# Patient Record
Sex: FEMALE | Race: OTHER | HISPANIC OR LATINO
[De-identification: names, ages, dates, MRNs, and addresses within clinical notes are randomized per-mention and may not be internally consistent; named-entity substitution may affect disease eponyms.]

---

## 2017-04-05 PROBLEM — Z00.00 ENCOUNTER FOR PREVENTIVE HEALTH EXAMINATION: Status: ACTIVE | Noted: 2017-04-05

## 2017-04-06 ENCOUNTER — APPOINTMENT (OUTPATIENT)
Dept: VASCULAR SURGERY | Facility: CLINIC | Age: 82
End: 2017-04-06

## 2017-04-06 VITALS
SYSTOLIC BLOOD PRESSURE: 155 MMHG | TEMPERATURE: 98.7 F | DIASTOLIC BLOOD PRESSURE: 76 MMHG | HEIGHT: 58.5 IN | HEART RATE: 70 BPM | RESPIRATION RATE: 16 BRPM | OXYGEN SATURATION: 99 % | WEIGHT: 150 LBS | BODY MASS INDEX: 30.65 KG/M2

## 2017-04-06 DIAGNOSIS — I78.1 NEVUS, NON-NEOPLASTIC: ICD-10-CM

## 2017-04-06 RX ORDER — METFORMIN HYDROCHLORIDE 1000 MG/1
1000 TABLET, COATED ORAL
Refills: 0 | Status: ACTIVE | COMMUNITY

## 2017-04-06 RX ORDER — GLIMEPIRIDE 1 MG/1
1 TABLET ORAL
Refills: 0 | Status: ACTIVE | COMMUNITY

## 2017-04-06 RX ORDER — LOSARTAN POTASSIUM 100 MG/1
TABLET, FILM COATED ORAL
Refills: 0 | Status: ACTIVE | COMMUNITY

## 2017-07-24 ENCOUNTER — APPOINTMENT (OUTPATIENT)
Dept: VASCULAR SURGERY | Facility: CLINIC | Age: 82
End: 2017-07-24

## 2018-08-01 ENCOUNTER — OUTPATIENT (OUTPATIENT)
Dept: OUTPATIENT SERVICES | Facility: HOSPITAL | Age: 83
LOS: 1 days | End: 2018-08-01
Payer: MEDICAID

## 2018-08-01 ENCOUNTER — OUTPATIENT (OUTPATIENT)
Dept: OUTPATIENT SERVICES | Facility: HOSPITAL | Age: 83
LOS: 1 days | End: 2018-08-01

## 2018-08-01 PROCEDURE — G9001: CPT

## 2018-08-07 ENCOUNTER — INPATIENT (INPATIENT)
Facility: HOSPITAL | Age: 83
LOS: 2 days | Discharge: HOME | DRG: 554 | End: 2018-08-10
Attending: HOSPITALIST | Admitting: HOSPITALIST
Payer: MEDICAID

## 2018-08-07 VITALS — HEIGHT: 62 IN | WEIGHT: 145.06 LBS

## 2018-08-07 DIAGNOSIS — I10 ESSENTIAL (PRIMARY) HYPERTENSION: ICD-10-CM

## 2018-08-07 DIAGNOSIS — R07.9 CHEST PAIN, UNSPECIFIED: ICD-10-CM

## 2018-08-07 DIAGNOSIS — E11.9 TYPE 2 DIABETES MELLITUS WITHOUT COMPLICATIONS: ICD-10-CM

## 2018-08-07 DIAGNOSIS — Z90.710 ACQUIRED ABSENCE OF BOTH CERVIX AND UTERUS: Chronic | ICD-10-CM

## 2018-08-07 DIAGNOSIS — M25.521 PAIN IN RIGHT ELBOW: ICD-10-CM

## 2018-08-07 DIAGNOSIS — E87.1 HYPO-OSMOLALITY AND HYPONATREMIA: ICD-10-CM

## 2018-08-07 DIAGNOSIS — M19.029 PRIMARY OSTEOARTHRITIS, UNSPECIFIED ELBOW: ICD-10-CM

## 2018-08-07 LAB
ALBUMIN SERPL ELPH-MCNC: 3.8 G/DL — SIGNIFICANT CHANGE UP (ref 3.3–5.2)
ALP SERPL-CCNC: 70 U/L — SIGNIFICANT CHANGE UP (ref 40–120)
ALT FLD-CCNC: 12 U/L — SIGNIFICANT CHANGE UP
ANION GAP SERPL CALC-SCNC: 16 MMOL/L — SIGNIFICANT CHANGE UP (ref 5–17)
APTT BLD: 27.8 SEC — SIGNIFICANT CHANGE UP (ref 27.5–37.4)
AST SERPL-CCNC: 28 U/L — SIGNIFICANT CHANGE UP
B PERT IGG+IGM PNL SER: ABNORMAL
BASOPHILS # BLD AUTO: 0 K/UL — SIGNIFICANT CHANGE UP (ref 0–0.2)
BASOPHILS NFR BLD AUTO: 0.1 % — SIGNIFICANT CHANGE UP (ref 0–2)
BILIRUB SERPL-MCNC: 0.3 MG/DL — LOW (ref 0.4–2)
BUN SERPL-MCNC: 22 MG/DL — HIGH (ref 8–20)
CALCIUM SERPL-MCNC: 9.3 MG/DL — SIGNIFICANT CHANGE UP (ref 8.6–10.2)
CHLORIDE SERPL-SCNC: 91 MMOL/L — LOW (ref 98–107)
CO2 SERPL-SCNC: 21 MMOL/L — LOW (ref 22–29)
COLOR FLD: YELLOW
CREAT SERPL-MCNC: 0.85 MG/DL — SIGNIFICANT CHANGE UP (ref 0.5–1.3)
CRYSTALS SNV QL MICRO: PRESENT — SIGNIFICANT CHANGE UP
EOSINOPHIL # BLD AUTO: 0 K/UL — SIGNIFICANT CHANGE UP (ref 0–0.5)
EOSINOPHIL NFR BLD AUTO: 0.2 % — SIGNIFICANT CHANGE UP (ref 0–6)
FLUID INTAKE SUBSTANCE CLASS: SIGNIFICANT CHANGE UP
FLUID SEGMENTED GRANULOCYTES: 89 % — SIGNIFICANT CHANGE UP
GLUCOSE BLDC GLUCOMTR-MCNC: 376 MG/DL — HIGH (ref 70–99)
GLUCOSE SERPL-MCNC: 309 MG/DL — HIGH (ref 70–115)
GRAM STN FLD: SIGNIFICANT CHANGE UP
HCT VFR BLD CALC: 33.4 % — LOW (ref 37–47)
HGB BLD-MCNC: 10.9 G/DL — LOW (ref 12–16)
INR BLD: 0.96 RATIO — SIGNIFICANT CHANGE UP (ref 0.88–1.16)
LYMPHOCYTES # BLD AUTO: 0.8 K/UL — LOW (ref 1–4.8)
LYMPHOCYTES # BLD AUTO: 7.7 % — LOW (ref 20–55)
LYMPHOCYTES # FLD: 11 % — SIGNIFICANT CHANGE UP
MCHC RBC-ENTMCNC: 28.3 PG — SIGNIFICANT CHANGE UP (ref 27–31)
MCHC RBC-ENTMCNC: 32.6 G/DL — SIGNIFICANT CHANGE UP (ref 32–36)
MCV RBC AUTO: 86.8 FL — SIGNIFICANT CHANGE UP (ref 81–99)
MONOCYTES # BLD AUTO: 1 K/UL — HIGH (ref 0–0.8)
MONOCYTES NFR BLD AUTO: 9.7 % — SIGNIFICANT CHANGE UP (ref 3–10)
NEUTROPHILS # BLD AUTO: 8.7 K/UL — HIGH (ref 1.8–8)
NEUTROPHILS NFR BLD AUTO: 82.1 % — HIGH (ref 37–73)
PLATELET # BLD AUTO: 214 K/UL — SIGNIFICANT CHANGE UP (ref 150–400)
POTASSIUM SERPL-MCNC: 4.9 MMOL/L — SIGNIFICANT CHANGE UP (ref 3.5–5.3)
POTASSIUM SERPL-SCNC: 4.9 MMOL/L — SIGNIFICANT CHANGE UP (ref 3.5–5.3)
PROT SERPL-MCNC: 7.4 G/DL — SIGNIFICANT CHANGE UP (ref 6.6–8.7)
PROTHROM AB SERPL-ACNC: 10.6 SEC — SIGNIFICANT CHANGE UP (ref 9.8–12.7)
RBC # BLD: 3.85 M/UL — LOW (ref 4.4–5.2)
RBC # FLD: 13.3 % — SIGNIFICANT CHANGE UP (ref 11–15.6)
RCV VOL RI: 2050 /UL — HIGH (ref 0–5)
SODIUM SERPL-SCNC: 128 MMOL/L — LOW (ref 135–145)
SPECIMEN SOURCE: SIGNIFICANT CHANGE UP
TOTAL NUCLEATED CELL COUNT, BODY FLUID: HIGH /UL (ref 0–5)
TROPONIN T SERPL-MCNC: <0.01 NG/ML — SIGNIFICANT CHANGE UP (ref 0–0.06)
TUBE TYPE: SIGNIFICANT CHANGE UP
WBC # BLD: 10.6 K/UL — SIGNIFICANT CHANGE UP (ref 4.8–10.8)
WBC # FLD AUTO: 10.6 K/UL — SIGNIFICANT CHANGE UP (ref 4.8–10.8)

## 2018-08-07 PROCEDURE — 73030 X-RAY EXAM OF SHOULDER: CPT | Mod: 26,RT

## 2018-08-07 PROCEDURE — 99285 EMERGENCY DEPT VISIT HI MDM: CPT | Mod: 25

## 2018-08-07 PROCEDURE — 71045 X-RAY EXAM CHEST 1 VIEW: CPT | Mod: 26

## 2018-08-07 PROCEDURE — 93010 ELECTROCARDIOGRAM REPORT: CPT

## 2018-08-07 PROCEDURE — 73070 X-RAY EXAM OF ELBOW: CPT | Mod: 26,RT

## 2018-08-07 PROCEDURE — 99222 1ST HOSP IP/OBS MODERATE 55: CPT

## 2018-08-07 PROCEDURE — 73200 CT UPPER EXTREMITY W/O DYE: CPT | Mod: 26,RT

## 2018-08-07 RX ORDER — ASPIRIN/CALCIUM CARB/MAGNESIUM 324 MG
325 TABLET ORAL ONCE
Qty: 0 | Refills: 0 | Status: DISCONTINUED | OUTPATIENT
Start: 2018-08-07 | End: 2018-08-07

## 2018-08-07 RX ORDER — DEXTROSE 50 % IN WATER 50 %
25 SYRINGE (ML) INTRAVENOUS ONCE
Qty: 0 | Refills: 0 | Status: DISCONTINUED | OUTPATIENT
Start: 2018-08-07 | End: 2018-08-10

## 2018-08-07 RX ORDER — DEXTROSE 50 % IN WATER 50 %
12.5 SYRINGE (ML) INTRAVENOUS ONCE
Qty: 0 | Refills: 0 | Status: DISCONTINUED | OUTPATIENT
Start: 2018-08-07 | End: 2018-08-10

## 2018-08-07 RX ORDER — SODIUM CHLORIDE 9 MG/ML
1000 INJECTION INTRAMUSCULAR; INTRAVENOUS; SUBCUTANEOUS
Qty: 0 | Refills: 0 | Status: DISCONTINUED | OUTPATIENT
Start: 2018-08-07 | End: 2018-08-10

## 2018-08-07 RX ORDER — ACETAMINOPHEN 500 MG
650 TABLET ORAL ONCE
Qty: 0 | Refills: 0 | Status: COMPLETED | OUTPATIENT
Start: 2018-08-07 | End: 2018-08-07

## 2018-08-07 RX ORDER — LOSARTAN POTASSIUM 100 MG/1
100 TABLET, FILM COATED ORAL DAILY
Qty: 0 | Refills: 0 | Status: DISCONTINUED | OUTPATIENT
Start: 2018-08-07 | End: 2018-08-10

## 2018-08-07 RX ORDER — INSULIN LISPRO 100/ML
VIAL (ML) SUBCUTANEOUS
Qty: 0 | Refills: 0 | Status: DISCONTINUED | OUTPATIENT
Start: 2018-08-07 | End: 2018-08-10

## 2018-08-07 RX ORDER — INSULIN LISPRO 100/ML
4 VIAL (ML) SUBCUTANEOUS ONCE
Qty: 0 | Refills: 0 | Status: COMPLETED | OUTPATIENT
Start: 2018-08-07 | End: 2018-08-07

## 2018-08-07 RX ORDER — GABAPENTIN 400 MG/1
0 CAPSULE ORAL
Qty: 0 | Refills: 0 | COMMUNITY

## 2018-08-07 RX ORDER — GLUCAGON INJECTION, SOLUTION 0.5 MG/.1ML
1 INJECTION, SOLUTION SUBCUTANEOUS ONCE
Qty: 0 | Refills: 0 | Status: DISCONTINUED | OUTPATIENT
Start: 2018-08-07 | End: 2018-08-10

## 2018-08-07 RX ORDER — VANCOMYCIN HCL 1 G
1000 VIAL (EA) INTRAVENOUS ONCE
Qty: 0 | Refills: 0 | Status: COMPLETED | OUTPATIENT
Start: 2018-08-07 | End: 2018-08-07

## 2018-08-07 RX ORDER — CIPROFLOXACIN LACTATE 400MG/40ML
400 VIAL (ML) INTRAVENOUS ONCE
Qty: 0 | Refills: 0 | Status: COMPLETED | OUTPATIENT
Start: 2018-08-07 | End: 2018-08-07

## 2018-08-07 RX ORDER — DEXTROSE 50 % IN WATER 50 %
15 SYRINGE (ML) INTRAVENOUS ONCE
Qty: 0 | Refills: 0 | Status: DISCONTINUED | OUTPATIENT
Start: 2018-08-07 | End: 2018-08-10

## 2018-08-07 RX ORDER — VANCOMYCIN HCL 1 G
1000 VIAL (EA) INTRAVENOUS EVERY 12 HOURS
Qty: 0 | Refills: 0 | Status: DISCONTINUED | OUTPATIENT
Start: 2018-08-08 | End: 2018-08-10

## 2018-08-07 RX ORDER — ACETAMINOPHEN 500 MG
650 TABLET ORAL EVERY 6 HOURS
Qty: 0 | Refills: 0 | Status: DISCONTINUED | OUTPATIENT
Start: 2018-08-07 | End: 2018-08-10

## 2018-08-07 RX ORDER — CHOLECALCIFEROL (VITAMIN D3) 125 MCG
1 CAPSULE ORAL
Qty: 0 | Refills: 0 | COMMUNITY

## 2018-08-07 RX ORDER — KETOROLAC TROMETHAMINE 30 MG/ML
30 SYRINGE (ML) INJECTION ONCE
Qty: 0 | Refills: 0 | Status: DISCONTINUED | OUTPATIENT
Start: 2018-08-07 | End: 2018-08-07

## 2018-08-07 RX ORDER — GABAPENTIN 400 MG/1
300 CAPSULE ORAL AT BEDTIME
Qty: 0 | Refills: 0 | Status: DISCONTINUED | OUTPATIENT
Start: 2018-08-07 | End: 2018-08-10

## 2018-08-07 RX ORDER — ASPIRIN/CALCIUM CARB/MAGNESIUM 324 MG
81 TABLET ORAL DAILY
Qty: 0 | Refills: 0 | Status: DISCONTINUED | OUTPATIENT
Start: 2018-08-07 | End: 2018-08-10

## 2018-08-07 RX ORDER — SODIUM CHLORIDE 9 MG/ML
1000 INJECTION, SOLUTION INTRAVENOUS
Qty: 0 | Refills: 0 | Status: DISCONTINUED | OUTPATIENT
Start: 2018-08-07 | End: 2018-08-10

## 2018-08-07 RX ORDER — CIPROFLOXACIN LACTATE 400MG/40ML
400 VIAL (ML) INTRAVENOUS EVERY 12 HOURS
Qty: 0 | Refills: 0 | Status: DISCONTINUED | OUTPATIENT
Start: 2018-08-07 | End: 2018-08-08

## 2018-08-07 RX ORDER — OXYCODONE AND ACETAMINOPHEN 5; 325 MG/1; MG/1
1 TABLET ORAL EVERY 6 HOURS
Qty: 0 | Refills: 0 | Status: DISCONTINUED | OUTPATIENT
Start: 2018-08-07 | End: 2018-08-10

## 2018-08-07 RX ORDER — MORPHINE SULFATE 50 MG/1
2 CAPSULE, EXTENDED RELEASE ORAL ONCE
Qty: 0 | Refills: 0 | Status: DISCONTINUED | OUTPATIENT
Start: 2018-08-07 | End: 2018-08-07

## 2018-08-07 RX ORDER — CHOLECALCIFEROL (VITAMIN D3) 125 MCG
1000 CAPSULE ORAL DAILY
Qty: 0 | Refills: 0 | Status: DISCONTINUED | OUTPATIENT
Start: 2018-08-07 | End: 2018-08-10

## 2018-08-07 RX ORDER — SODIUM CHLORIDE 9 MG/ML
1000 INJECTION INTRAMUSCULAR; INTRAVENOUS; SUBCUTANEOUS ONCE
Qty: 0 | Refills: 0 | Status: COMPLETED | OUTPATIENT
Start: 2018-08-07 | End: 2018-08-07

## 2018-08-07 RX ORDER — VANCOMYCIN HCL 1 G
VIAL (EA) INTRAVENOUS
Qty: 0 | Refills: 0 | Status: DISCONTINUED | OUTPATIENT
Start: 2018-08-07 | End: 2018-08-10

## 2018-08-07 RX ADMIN — SODIUM CHLORIDE 1000 MILLILITER(S): 9 INJECTION INTRAMUSCULAR; INTRAVENOUS; SUBCUTANEOUS at 10:22

## 2018-08-07 RX ADMIN — Medication 650 MILLIGRAM(S): at 18:40

## 2018-08-07 RX ADMIN — GABAPENTIN 300 MILLIGRAM(S): 400 CAPSULE ORAL at 21:53

## 2018-08-07 RX ADMIN — Medication 30 MILLIGRAM(S): at 10:45

## 2018-08-07 RX ADMIN — SODIUM CHLORIDE 100 MILLILITER(S): 9 INJECTION INTRAMUSCULAR; INTRAVENOUS; SUBCUTANEOUS at 21:52

## 2018-08-07 RX ADMIN — Medication 4 UNIT(S): at 23:52

## 2018-08-07 RX ADMIN — Medication 30 MILLIGRAM(S): at 10:22

## 2018-08-07 RX ADMIN — Medication 200 MILLIGRAM(S): at 23:11

## 2018-08-07 RX ADMIN — Medication 250 MILLIGRAM(S): at 21:53

## 2018-08-07 RX ADMIN — Medication 650 MILLIGRAM(S): at 17:37

## 2018-08-07 NOTE — ED ADULT NURSE REASSESSMENT NOTE - NS ED NURSE REASSESS COMMENT FT1
patient a&ox3. patient and daughter informed on plan of care. will continue to monitor.
patient awake, alert, pt. states she is feeling better. daughter at bedside. vss. as documented. will continue to monitor.
Pt A&Ox4 c/o right elbow pain at this time. Pt resting comfortably, VSS, no signs of distress at this time, awaiting bed, report given to HR RN GV, moved to CDU 10 R, safety maintained, call bell in reach.
Report received from day RN CB, charting as noted. Pt A&Ox4 c/o right elbow pain at this time. Pt resting comfortably, VSS, no signs of distress at this time, CM in place, awaiting bed, safety maintained, call bell in reach.

## 2018-08-07 NOTE — ED STATDOCS - ATTENDING CONTRIBUTION TO CARE
Case d/w ACP: agree with retriage to main ED for close monitoring during ED evaluation and treatment.  Protocol orders placed by ACP

## 2018-08-07 NOTE — ED PROVIDER NOTE - SKIN, MLM
Skin normal color for race, warm, dry and intact. No evidence of rash. Skin and oral mucosae pale, warm, dry and intact. No evidence of rash.

## 2018-08-07 NOTE — ED PROVIDER NOTE - PLAN OF CARE
We found crystals and white blood cells on your joint. We ruled out septic arthritis. Its recommended that you follow with you PMD MIC Collazo, within 48 hrs and with orthopedics within 1 week.

## 2018-08-07 NOTE — ED PROVIDER NOTE - OBJECTIVE STATEMENT
82 YO F with PMH of DM, HTN, HLD who c/o right elbow pain (can't say the quality) since yesterday at 3 pm, was 10/10, constant, radiated to ipsilateral forearm and hand, worsened by movement and mildly alleviated with Tylenol, associated with numbness o the extremity and with chest pain pressure like, shortness of breath, nausea overnight. Patient continued with pain this am, reason why was BIB daughter to the ED. Mentions that chest pain is not related to meals, not worsened by deep inspiration or chest movement.

## 2018-08-07 NOTE — ED PROVIDER NOTE - PROGRESS NOTE DETAILS
Patient s/p 1L IV NS bolus, Toradol. Now  s/p right elbow drainage by Ortho, tolerated procedure well. She is eating, comfortable in bed except for elbow pain 5/10. Fluid results pending, Patient s/p 1L IV NS bolus, Toradol. Now  s/p right elbow drainage by Ortho, tolerated procedure well. She is eating, comfortable in bed except for elbow pain 5/10. Will give Tylenol or pain. Fluid results pending,

## 2018-08-07 NOTE — ED PROVIDER NOTE - MEDICAL DECISION MAKING DETAILS
CBC, CMP, PT/PTT/INR, CT head, CXR, elbow CR, ASA, cardiac monitor, EKG CBC, CMP, PT/PTT/INR, CXR, elbow XR, cardiac monitor, EKG, ortho consult, pain management

## 2018-08-07 NOTE — ED STATDOCS - PROGRESS NOTE DETAILS
This is a 83 year old female with pmhx of HTN, DM BIB daughter with c/o chest pain radiating down R arm with weakness and dizziness and nausea that started at 3pm yesterday, daughter admits prior R shoulder SX x 4 years ago, no h/o stents, no h/o smoking, on PE patient unstead on feet upon standing from sitting position, unable to lift R arm up in air, will need further eval in MAIN, labs placed and EKG done.

## 2018-08-07 NOTE — ED PROVIDER NOTE - ATTENDING CONTRIBUTION TO CARE
83 year old with right elbow pain.  Patient denies trauma, fall or injury.  Swelling, warmth and tenderness on exam.  Decrease range of motion also noted on exam with pain to passive range of motion.  Xray shows no fracture of the elbow.  Orthopedics was consulted for concerns of septic arthritis.  Arthrocentesis performed by Ortho and patient admitted to medicine pending cultures.  Patient given medication for pain.

## 2018-08-07 NOTE — CONSULT NOTE ADULT - SUBJECTIVE AND OBJECTIVE BOX
Patient is a 83y Female presenting to the emergency department with a chief complaint of right elbow pain sudden onset beginning yesterday without trauma or injury. Patient states pain is located all around the elbow and radiates up to shoulder and wrist. Denies neck pain , numbness/tingling. Patient states she had a right shoulder dislocation 4 years ago but no injury recently. Denies fever, chills. Hospital  present for entire interaction.    REVIEW OF SYSTEMS    General:	denies fever, chills    Skin/Breast: denies rashes  	  Respiratory and Thorax: denies SOB  	  Cardiovascular:	denies CP    Gastrointestinal:	denies Abdominal pain    Genitourinary:	denies incontinence    Musculoskeletal:	 + right elbow and shoulder pain    Neurological:	denies paresthesias    PAST MEDICAL & SURGICAL HISTORY:  High blood pressure  Diabetes  H/O: hysterectomy    Allergies: penicillins (Unknown)    Medications: ketorolac   Injectable 30 milliGRAM(s) IV Push Once  sodium chloride 0.9% Bolus 1000 milliLiter(s) IV Bolus once    FAMILY HISTORY:  No pertinent family history in first degree relatives  : non-contributory    Social History: lives at home with daughter  ETOH: denies   Smoking: denies  illicit drug use: denies                         10.9   10.6  )-----------( 214      ( 07 Aug 2018 08:11 )             33.4     08-07    128<L>  |  91<L>  |  22.0<H>  ----------------------------<  309<H>  4.9   |  21.0<L>  |  0.85    PT/INR - ( 07 Aug 2018 08:11 )   PT: 10.6 sec;   INR: 0.96 ratio         PTT - ( 07 Aug 2018 08:11 )  PTT:27.8 sec    PHYSICAL EXAM:    Vital Signs Last 24 Hrs  T(C): 36.7 (07 Aug 2018 07:21), Max: 36.7 (07 Aug 2018 07:21)  T(F): 98.1 (07 Aug 2018 07:21), Max: 98.1 (07 Aug 2018 07:21)  HR: 78 (07 Aug 2018 07:21) (78 - 78)  BP: 135/80 (07 Aug 2018 07:21) (135/80 - 135/80)  RR: 16 (07 Aug 2018 07:21) (16 - 16)  SpO2: 95% (07 Aug 2018 07:21) (95% - 95%)    Appearance: Alert, responsive, in no acute distress.    Right elbow skin intact, no swelling redness or warmth noted. ROM limited, patient holding it in flexion of 90 degrees at her side. PROM extension to 45 flexion to 100 limited by pain. +TTP grossly med/lateral and posterior. No sign of olecranon bursitis, olecranon non-tender.     AIN/PIN/intrinsics intact  SILT Rad/med/uln distrib. Rad pulse +2  Wrist grossly NT, PROM of right shoulder limited to 35 degrees abduction by pain with associated hiking from initial start of range, +TTP around shoulder joint, skin intact, no erythema, open wounds or ecchymosis, no obvious deformity noted.    Imaging Studies: right elbow < from: Xray Elbow AP + Lateral, Right (08.07.18 @ 09:23) >   EXAM:  ELBOW 2VIEWS RT                          PROCEDURE DATE:  08/07/2018    INTERPRETATION:  Right elbow    3 views    History: Pain.    Osseous structures are intact. No evidence of acute fracture or   dislocation. Subchondral cysts and degenerative changes are present in   the radial head  There is no significant joint space narrowing. The   articular surfaces appear intact.. Effusion in the elbow. Posterior fat   pad sign    Impression:  Nonspecific effusion in the elbow.    Subchondral cysts and degenerative change in the radial head.     < end of copied text >    Right shoulder x-rays pending    A/P:  Pt is a  83y Female with right elbow pain r/o septic arthritis, right shoulder pain    PLAN:   ·	f/u right shoulder x-rays  ·	will d/w attending, likely needs IR aspiration of right elbow r/o septic arthritis  ·	NWB right UE  ·	Sling for comfort   ·	Will follow Patient is a 83y Female presenting to the emergency department with a chief complaint of right elbow pain sudden onset beginning yesterday without trauma or injury. Patient states pain is located all around the elbow and radiates up to shoulder and wrist. Denies neck pain , numbness/tingling. Patient states she had a right shoulder dislocation 4 years ago but no injury recently. Denies fever, chills. Hospital  present for entire interaction.    REVIEW OF SYSTEMS    General:	denies fever, chills    Skin/Breast: denies rashes  	  Respiratory and Thorax: denies SOB  	  Cardiovascular:	denies CP    Gastrointestinal:	denies Abdominal pain    Genitourinary:	denies incontinence    Musculoskeletal:	 + right elbow and shoulder pain    Neurological:	denies paresthesias    PAST MEDICAL & SURGICAL HISTORY:  High blood pressure  Diabetes  H/O: hysterectomy    Allergies: penicillins (Unknown)    Medications: ketorolac   Injectable 30 milliGRAM(s) IV Push Once  sodium chloride 0.9% Bolus 1000 milliLiter(s) IV Bolus once    FAMILY HISTORY:  No pertinent family history in first degree relatives  : non-contributory    Social History: lives at home with daughter  ETOH: denies   Smoking: denies  illicit drug use: denies                         10.9   10.6  )-----------( 214      ( 07 Aug 2018 08:11 )             33.4     08-07    128<L>  |  91<L>  |  22.0<H>  ----------------------------<  309<H>  4.9   |  21.0<L>  |  0.85    PT/INR - ( 07 Aug 2018 08:11 )   PT: 10.6 sec;   INR: 0.96 ratio         PTT - ( 07 Aug 2018 08:11 )  PTT:27.8 sec    PHYSICAL EXAM:    Vital Signs Last 24 Hrs  T(C): 36.7 (07 Aug 2018 07:21), Max: 36.7 (07 Aug 2018 07:21)  T(F): 98.1 (07 Aug 2018 07:21), Max: 98.1 (07 Aug 2018 07:21)  HR: 78 (07 Aug 2018 07:21) (78 - 78)  BP: 135/80 (07 Aug 2018 07:21) (135/80 - 135/80)  RR: 16 (07 Aug 2018 07:21) (16 - 16)  SpO2: 95% (07 Aug 2018 07:21) (95% - 95%)    Appearance: Alert, responsive, in no acute distress.    Right elbow skin intact, no swelling redness or warmth noted. ROM limited, patient holding it in flexion of 90 degrees at her side. PROM extension to 45 flexion to 100 limited by pain. +TTP grossly med/lateral and posterior. No sign of olecranon bursitis, olecranon non-tender.     AIN/PIN/intrinsics intact  SILT Rad/med/uln distrib. Rad pulse +2  Wrist grossly NT, PROM of right shoulder limited to 35 degrees abduction by pain with associated hiking from initial start of range, +TTP around shoulder joint, skin intact, no erythema, open wounds or ecchymosis, no obvious deformity noted.    Imaging Studies: right elbow < from: Xray Elbow AP + Lateral, Right (08.07.18 @ 09:23) >   EXAM:  ELBOW 2VIEWS RT                          PROCEDURE DATE:  08/07/2018    INTERPRETATION:  Right elbow    3 views    History: Pain.    Osseous structures are intact. No evidence of acute fracture or   dislocation. Subchondral cysts and degenerative changes are present in   the radial head  There is no significant joint space narrowing. The   articular surfaces appear intact.. Effusion in the elbow. Posterior fat   pad sign    Impression:  Nonspecific effusion in the elbow.    Subchondral cysts and degenerative change in the radial head.     < end of copied text >    Right shoulder x-rays pending    A/P:  Pt is a  83y Female with right elbow pain r/o septic arthritis, right shoulder pain    PLAN:   ·	f/u right shoulder x-rays  ·	will d/w attending, needs IR aspiration of right elbow r/o septic arthritis  ·	NWB right UE  ·	Sling for comfort   ·	Will follow  ·	D/w Dr. Davis Patient is a 83y Female presenting to the emergency department with a chief complaint of right elbow pain sudden onset beginning yesterday without trauma or injury. Patient states pain is located all around the elbow and radiates up to shoulder and wrist. Denies neck pain , numbness/tingling. Patient states she had a right shoulder dislocation 4 years ago but no injury recently. Denies fever, chills. Hospital  present for entire interaction.    REVIEW OF SYSTEMS    General:	denies fever, chills    Skin/Breast: denies rashes  	  Respiratory and Thorax: denies SOB  	  Cardiovascular:	denies CP    Gastrointestinal:	denies Abdominal pain    Genitourinary:	denies incontinence    Musculoskeletal:	 + right elbow and shoulder pain    Neurological:	denies paresthesias    PAST MEDICAL & SURGICAL HISTORY:  High blood pressure  Diabetes  H/O: hysterectomy    Allergies: penicillins (Unknown)    Medications: ketorolac   Injectable 30 milliGRAM(s) IV Push Once  sodium chloride 0.9% Bolus 1000 milliLiter(s) IV Bolus once    FAMILY HISTORY:  No pertinent family history in first degree relatives  : non-contributory    Social History: lives at home with daughter  ETOH: denies   Smoking: denies  illicit drug use: denies                         10.9   10.6  )-----------( 214      ( 07 Aug 2018 08:11 )             33.4     08-07    128<L>  |  91<L>  |  22.0<H>  ----------------------------<  309<H>  4.9   |  21.0<L>  |  0.85    PT/INR - ( 07 Aug 2018 08:11 )   PT: 10.6 sec;   INR: 0.96 ratio         PTT - ( 07 Aug 2018 08:11 )  PTT:27.8 sec    PHYSICAL EXAM:    Vital Signs Last 24 Hrs  T(C): 36.7 (07 Aug 2018 07:21), Max: 36.7 (07 Aug 2018 07:21)  T(F): 98.1 (07 Aug 2018 07:21), Max: 98.1 (07 Aug 2018 07:21)  HR: 78 (07 Aug 2018 07:21) (78 - 78)  BP: 135/80 (07 Aug 2018 07:21) (135/80 - 135/80)  RR: 16 (07 Aug 2018 07:21) (16 - 16)  SpO2: 95% (07 Aug 2018 07:21) (95% - 95%)    Appearance: Alert, responsive, in no acute distress.    Right elbow skin intact, no swelling redness or warmth noted. ROM limited, patient holding it in flexion of 90 degrees at her side. PROM extension to 45 flexion to 100 limited by pain. +TTP grossly med/lateral and posterior. No sign of olecranon bursitis, olecranon non-tender.     AIN/PIN/intrinsics intact  SILT Rad/med/uln distrib. Rad pulse +2  Wrist grossly NT, PROM of right shoulder limited to 35 degrees abduction by pain with associated hiking from initial start of range, +TTP around shoulder joint, skin intact, no erythema, open wounds or ecchymosis, no obvious deformity noted.  Cervical spine skin intact, no erythema, open wounds, or ecchymosis. Cervical spine grossly NT to palpation.    Imaging Studies: right elbow < from: Xray Elbow AP + Lateral, Right (08.07.18 @ 09:23) >   EXAM:  ELBOW 2VIEWS RT                          PROCEDURE DATE:  08/07/2018    INTERPRETATION:  Right elbow    3 views    History: Pain.    Osseous structures are intact. No evidence of acute fracture or   dislocation. Subchondral cysts and degenerative changes are present in   the radial head  There is no significant joint space narrowing. The   articular surfaces appear intact.. Effusion in the elbow. Posterior fat   pad sign    Impression:  Nonspecific effusion in the elbow.    Subchondral cysts and degenerative change in the radial head.     < end of copied text >    Right shoulder x-rays pending    A/P:  Pt is a  83y Female with right elbow pain r/o septic arthritis, right shoulder pain    PLAN:   ·	f/u right shoulder x-rays  ·	will d/w attending, needs IR aspiration of right elbow r/o septic arthritis  ·	NWB right UE  ·	Sling for comfort   ·	Will follow  ·	D/w Dr. Davis Patient is a 83y Female presenting to the emergency department with a chief complaint of right elbow pain sudden onset beginning yesterday without trauma or injury. Patient states pain is located all around the elbow and radiates up to shoulder and wrist. Denies neck pain , numbness/tingling. Patient states she had a right shoulder dislocation 4 years ago but no injury recently. Denies fever, chills. Hospital  present for entire interaction.    REVIEW OF SYSTEMS    General:	denies fever, chills    Skin/Breast: denies rashes  	  Respiratory and Thorax: denies SOB  	  Cardiovascular:	denies CP    Gastrointestinal:	denies Abdominal pain    Genitourinary:	denies incontinence    Musculoskeletal:	 + right elbow and shoulder pain    Neurological:	denies paresthesias    PAST MEDICAL & SURGICAL HISTORY:  High blood pressure  Diabetes  H/O: hysterectomy    Allergies: penicillins (Unknown)    Medications: ketorolac   Injectable 30 milliGRAM(s) IV Push Once  sodium chloride 0.9% Bolus 1000 milliLiter(s) IV Bolus once    FAMILY HISTORY:  No pertinent family history in first degree relatives  : non-contributory    Social History: lives at home with daughter  ETOH: denies   Smoking: denies  illicit drug use: denies                         10.9   10.6  )-----------( 214      ( 07 Aug 2018 08:11 )             33.4     08-07    128<L>  |  91<L>  |  22.0<H>  ----------------------------<  309<H>  4.9   |  21.0<L>  |  0.85    PT/INR - ( 07 Aug 2018 08:11 )   PT: 10.6 sec;   INR: 0.96 ratio         PTT - ( 07 Aug 2018 08:11 )  PTT:27.8 sec    PHYSICAL EXAM:    Vital Signs Last 24 Hrs  T(C): 36.7 (07 Aug 2018 07:21), Max: 36.7 (07 Aug 2018 07:21)  T(F): 98.1 (07 Aug 2018 07:21), Max: 98.1 (07 Aug 2018 07:21)  HR: 78 (07 Aug 2018 07:21) (78 - 78)  BP: 135/80 (07 Aug 2018 07:21) (135/80 - 135/80)  RR: 16 (07 Aug 2018 07:21) (16 - 16)  SpO2: 95% (07 Aug 2018 07:21) (95% - 95%)    Appearance: Alert, responsive, in no acute distress.    Right elbow skin intact, no swelling redness or warmth noted. ROM limited, patient holding it in flexion of 90 degrees at her side. PROM extension to 45 flexion to 100 limited by pain. +TTP grossly med/lateral and posterior. No sign of olecranon bursitis, olecranon non-tender.     AIN/PIN/intrinsics intact  SILT Rad/med/uln distrib. Rad pulse +2  Wrist grossly NT, PROM of right shoulder limited to 35 degrees abduction by pain with associated hiking from initial start of range, +TTP around shoulder joint, skin intact, no erythema, open wounds or ecchymosis, no obvious deformity noted.  Cervical spine skin intact, no erythema, open wounds, or ecchymosis. Cervical spine grossly NT to palpation.    Imaging Studies: right elbow < from: Xray Elbow AP + Lateral, Right (08.07.18 @ 09:23) >   EXAM:  ELBOW 2VIEWS RT                          PROCEDURE DATE:  08/07/2018    INTERPRETATION:  Right elbow    3 views    History: Pain.    Osseous structures are intact. No evidence of acute fracture or   dislocation. Subchondral cysts and degenerative changes are present in   the radial head  There is no significant joint space narrowing. The   articular surfaces appear intact.. Effusion in the elbow. Posterior fat   pad sign    Impression:  Nonspecific effusion in the elbow.    Subchondral cysts and degenerative change in the radial head.     < end of copied text >    Right shoulder x-rays pending    A/P:  Pt is a  83y Female with right elbow pain r/o septic arthritis, right shoulder pain    PLAN:   ·	f/u right shoulder x-rays  ·	will d/w attending, needs IR aspiration of right elbow r/o septic arthritis  ·	NWB right UE  ·	Sling for comfort   ·	Will follow  ·	D/w Dr. Davis    Addendum   Aspirate results  Cell Count, Body Fluid (08.07.18 @ 14:56)    Fluid Segmented Granulocytes: 89 %    Fluid Type: Synovial fluid    Body Fluid Appearance: Cloudy    BF Lymphocytes: 11 %    Tube Type: Lavender    Color - Body Fluid: Yellow    Total Nucleated Cell Count, Body Fluid: 03539 /uL    Total RBC Count: 2050 /uL  +crystals      Culture - Body Fluid with Gram Stain (collected 07 Aug 2018 14:57)  Source: .Body Fluid Right Elbow, Synovial Fluid  Gram Stain (07 Aug 2018 15:44):    Numerous White blood cells    No organisms seen    A/P:  gout vs septic joint  Admit to medicine  Pain control   will continue to follow for culture results  D/W Dr. Davis

## 2018-08-07 NOTE — ED ADULT NURSE NOTE - OBJECTIVE STATEMENT
called to bedside. 83 year old year female coming to ed 300 in the afternoon. patient denies fall or injury to arm, hot to touch. unknown fevers. as per patient when the arm pain gets severe, she gets the left sided chest pain and then that leads to severe nausea and dizziness. patient denies difficulty breathing. patient states she feels like she wants to vomit at times but does not.

## 2018-08-07 NOTE — H&P ADULT - HISTORY OF PRESENT ILLNESS
83 years old female with PMH of DM and HTN presented to the ER with 2 days history of R elbow pain and swelling. Pain is constant, 8/10, radiating up and down the elbow. No h/o trauma, fever,  chills or similar pain in the past . Patient also reports an episode of sub-sternal chest pain yesterday. Pain was 6/10 and associated with SOB. No chest pain at the moment.

## 2018-08-07 NOTE — ED ADULT NURSE REASSESSMENT NOTE - NSFALLRSKASSESSTYPE_ED_ALL_ED
Could consider switching to standard release 50 mg in the morning and 25 mg in the evening 25 mg t.i.d. This would be generic.  
Patient has been on Coreg CR 80 mg po daily.  His insurance company has denied coverage twice and recommend changing to a generic form.  We will review with Dr Partida and emperatriz to [facility].  
Please call him, he has med questions.   
Routine Reassessment
Routine Reassessment

## 2018-08-07 NOTE — ED ADULT NURSE REASSESSMENT NOTE - NSIMPLEMENTINTERV_GEN_ALL_ED
Implemented All Universal Safety Interventions:  [place] to call system. Call bell, personal items and telephone within reach. Instruct patient to call for assistance. Room bathroom lighting operational. Non-slip footwear when patient is off stretcher. Physically safe environment: no spills, clutter or unnecessary equipment. Stretcher in lowest position, wheels locked, appropriate side rails in place.
Implemented All Fall Risk Interventions:  [place] to call system. Call bell, personal items and telephone within reach. Instruct patient to call for assistance. Room bathroom lighting operational. Non-slip footwear when patient is off stretcher. Physically safe environment: no spills, clutter or unnecessary equipment. Stretcher in lowest position, wheels locked, appropriate side rails in place. Provide visual cue, wrist band, yellow gown, etc. Monitor gait and stability. Monitor for mental status changes and reorient to person, place, and time. Review medications for side effects contributing to fall risk. Reinforce activity limits and safety measures with patient and family.

## 2018-08-07 NOTE — ED ADULT NURSE REASSESSMENT NOTE - COMFORT CARE
repositioned/side rails up/darkened lights/warm blanket provided/wait time explained/plan of care explained

## 2018-08-07 NOTE — ED ADULT NURSE NOTE - NSIMPLEMENTINTERV_GEN_ALL_ED
Implemented All Fall Risk Interventions:  [place] to call system. Call bell, personal items and telephone within reach. Instruct patient to call for assistance. Room bathroom lighting operational. Non-slip footwear when patient is off stretcher. Physically safe environment: no spills, clutter or unnecessary equipment. Stretcher in lowest position, wheels locked, appropriate side rails in place. Provide visual cue, wrist band, yellow gown, etc. Monitor gait and stability. Monitor for mental status changes and reorient to person, place, and time. Review medications for side effects contributing to fall risk. Reinforce activity limits and safety measures with patient and family.

## 2018-08-07 NOTE — ED PROVIDER NOTE - ENMT NEGATIVE STATEMENT, MLM
Ears: no ear pain and no hearing problems. Nose: no nasal congestion and no nasal drainage .Mouth/Throat: no dysphagia, no hoarseness and no throat pain.Neck: no lumps, no pain, no stiffness and no swollen glands.

## 2018-08-07 NOTE — H&P ADULT - PROBLEM SELECTOR PLAN 1
?etiology.  Gout vs septic arthritis  x-ray of the elbow reviewed, no fracture or acute pathology.  x-ray of R shoulder concerning for nondisplaced glenoid cavity fracture  ortho has been consulted,   s/p R elbow arthrocentesis.  CT of R shoulder ordered.  started on vanco.  prn tylenol.  follow up synovial fluid results.  ID consult

## 2018-08-07 NOTE — ED ADULT NURSE REASSESSMENT NOTE - COMFORT CARE
warm blanket provided/repositioned/plan of care explained/side rails up/wait time explained/assisted to bathroom/darkened lights

## 2018-08-07 NOTE — ED PROVIDER NOTE - CARE PLAN
Principal Discharge DX:	Arthritis of elbow  Assessment and plan of treatment:	We found crystals and white blood cells on your joint. We ruled out septic arthritis. Its recommended that you follow with you PMD MIC Collazo, within 48 hrs and with orthopedics within 1 week. Principal Discharge DX:	Arthritis of elbow

## 2018-08-08 DIAGNOSIS — Z88.0 ALLERGY STATUS TO PENICILLIN: ICD-10-CM

## 2018-08-08 DIAGNOSIS — M19.029 PRIMARY OSTEOARTHRITIS, UNSPECIFIED ELBOW: ICD-10-CM

## 2018-08-08 LAB
ANION GAP SERPL CALC-SCNC: 12 MMOL/L — SIGNIFICANT CHANGE UP (ref 5–17)
BUN SERPL-MCNC: 18 MG/DL — SIGNIFICANT CHANGE UP (ref 8–20)
CALCIUM SERPL-MCNC: 8.5 MG/DL — LOW (ref 8.6–10.2)
CHLORIDE SERPL-SCNC: 100 MMOL/L — SIGNIFICANT CHANGE UP (ref 98–107)
CO2 SERPL-SCNC: 24 MMOL/L — SIGNIFICANT CHANGE UP (ref 22–29)
CREAT SERPL-MCNC: 0.8 MG/DL — SIGNIFICANT CHANGE UP (ref 0.5–1.3)
GLUCOSE BLDC GLUCOMTR-MCNC: 211 MG/DL — HIGH (ref 70–99)
GLUCOSE BLDC GLUCOMTR-MCNC: 253 MG/DL — HIGH (ref 70–99)
GLUCOSE BLDC GLUCOMTR-MCNC: 263 MG/DL — HIGH (ref 70–99)
GLUCOSE BLDC GLUCOMTR-MCNC: 292 MG/DL — HIGH (ref 70–99)
GLUCOSE SERPL-MCNC: 191 MG/DL — HIGH (ref 70–115)
HBA1C BLD-MCNC: 12 % — HIGH (ref 4–5.6)
POTASSIUM SERPL-MCNC: 3.7 MMOL/L — SIGNIFICANT CHANGE UP (ref 3.5–5.3)
POTASSIUM SERPL-SCNC: 3.7 MMOL/L — SIGNIFICANT CHANGE UP (ref 3.5–5.3)
SODIUM SERPL-SCNC: 136 MMOL/L — SIGNIFICANT CHANGE UP (ref 135–145)

## 2018-08-08 PROCEDURE — 99233 SBSQ HOSP IP/OBS HIGH 50: CPT

## 2018-08-08 PROCEDURE — 99223 1ST HOSP IP/OBS HIGH 75: CPT

## 2018-08-08 RX ORDER — ENOXAPARIN SODIUM 100 MG/ML
40 INJECTION SUBCUTANEOUS DAILY
Qty: 0 | Refills: 0 | Status: DISCONTINUED | OUTPATIENT
Start: 2018-08-08 | End: 2018-08-10

## 2018-08-08 RX ORDER — PANTOPRAZOLE SODIUM 20 MG/1
40 TABLET, DELAYED RELEASE ORAL
Qty: 0 | Refills: 0 | Status: DISCONTINUED | OUTPATIENT
Start: 2018-08-08 | End: 2018-08-10

## 2018-08-08 RX ORDER — INSULIN GLARGINE 100 [IU]/ML
10 INJECTION, SOLUTION SUBCUTANEOUS AT BEDTIME
Qty: 0 | Refills: 0 | Status: DISCONTINUED | OUTPATIENT
Start: 2018-08-08 | End: 2018-08-09

## 2018-08-08 RX ORDER — SACCHAROMYCES BOULARDII 250 MG
250 POWDER IN PACKET (EA) ORAL
Qty: 0 | Refills: 0 | Status: DISCONTINUED | OUTPATIENT
Start: 2018-08-08 | End: 2018-08-10

## 2018-08-08 RX ORDER — ONDANSETRON 8 MG/1
4 TABLET, FILM COATED ORAL ONCE
Qty: 0 | Refills: 0 | Status: DISCONTINUED | OUTPATIENT
Start: 2018-08-08 | End: 2018-08-10

## 2018-08-08 RX ORDER — MORPHINE SULFATE 50 MG/1
2 CAPSULE, EXTENDED RELEASE ORAL EVERY 6 HOURS
Qty: 0 | Refills: 0 | Status: DISCONTINUED | OUTPATIENT
Start: 2018-08-08 | End: 2018-08-10

## 2018-08-08 RX ADMIN — Medication 500 MILLIGRAM(S): at 18:53

## 2018-08-08 RX ADMIN — MORPHINE SULFATE 2 MILLIGRAM(S): 50 CAPSULE, EXTENDED RELEASE ORAL at 10:45

## 2018-08-08 RX ADMIN — GABAPENTIN 300 MILLIGRAM(S): 400 CAPSULE ORAL at 21:32

## 2018-08-08 RX ADMIN — MORPHINE SULFATE 2 MILLIGRAM(S): 50 CAPSULE, EXTENDED RELEASE ORAL at 12:00

## 2018-08-08 RX ADMIN — PANTOPRAZOLE SODIUM 40 MILLIGRAM(S): 20 TABLET, DELAYED RELEASE ORAL at 18:54

## 2018-08-08 RX ADMIN — LOSARTAN POTASSIUM 100 MILLIGRAM(S): 100 TABLET, FILM COATED ORAL at 05:20

## 2018-08-08 RX ADMIN — Medication 1000 UNIT(S): at 12:35

## 2018-08-08 RX ADMIN — INSULIN GLARGINE 10 UNIT(S): 100 INJECTION, SOLUTION SUBCUTANEOUS at 21:31

## 2018-08-08 RX ADMIN — SODIUM CHLORIDE 100 MILLILITER(S): 9 INJECTION INTRAMUSCULAR; INTRAVENOUS; SUBCUTANEOUS at 05:19

## 2018-08-08 RX ADMIN — Medication 3: at 12:35

## 2018-08-08 RX ADMIN — Medication 3: at 19:03

## 2018-08-08 RX ADMIN — Medication 250 MILLIGRAM(S): at 21:31

## 2018-08-08 RX ADMIN — Medication 81 MILLIGRAM(S): at 12:35

## 2018-08-08 RX ADMIN — Medication 2: at 08:39

## 2018-08-08 RX ADMIN — Medication 250 MILLIGRAM(S): at 18:53

## 2018-08-08 RX ADMIN — ENOXAPARIN SODIUM 40 MILLIGRAM(S): 100 INJECTION SUBCUTANEOUS at 12:34

## 2018-08-08 NOTE — PATIENT PROFILE ADULT. - PAIN, FACTORS THAT RELIEVE, PROFILE
Called patient per Dr Garcia Her to get his heart rate. Patient states he played golf today with no shortness of breath, heavy breathing, or no pain. He said he don't have anything to monitor his heart rate and its not on his blood pressure machine. Informed patient if he start to have any of those issues to go to the ER or call the office to get an appointment. He stated thanks for checking up on him and he will go or call if it becomes an issue for him.
medications

## 2018-08-08 NOTE — PROGRESS NOTE ADULT - ASSESSMENT
Problem/Plan - 1:  ·  Problem: Elbow pain, right. --> ortho and id following  s/p R elbow arthrocentesis.  --> c.w iv vanco  --> follow up cultures  --> will start naprosyn bid for 1 day      Problem/Plan - 2:  ·  Problem: Chest pain.  Plan: resolved.  negative troponin, No EKG changes.     Problem/Plan - 3:  ·  Problem: Hyponatremia. resolved     Problem/Plan - 4:  ·  Problem: HTN (hypertension).  Plan: controlled.  --> home meds     Problem/Plan - 5:  ·  Problem: Diabetes.  poorly controlled  --> diabetic education  --> start lantus qhs  --> ssi

## 2018-08-08 NOTE — CONSULT NOTE ADULT - SUBJECTIVE AND OBJECTIVE BOX
[facility]  INFECTIOUS DISEASES AND INTERNAL MEDICINE at [facility]  =======================================================  Govind Coronado MD  Diplomates American Board of Internal Medicine and Infectious Diseases  =======================================================      [id number]  EMERITA CADE .  Seen with an     CC: Patient is a 83y old  Female who presents with a chief complaint of R elbow pain (07 Aug 2018 20:39)    84y/o  Female with h/o DM and HTN. Patient comes to the ER with 2-3 days of R elbow pain and swelling, associated with decreased ROM. No associated fevers or chills. Only worsened with movement. In the ER patient was afebrile, no leukocytosis. Synovial aspiration done in the ER by orthopedics. Patient started on IV Vancomycin. ID input requested.       Past Medical & Surgical Hx:  High blood pressure  Diabetes  H/O: hysterectomy      Social Hx:  Denies smoking, ETOH or drug use      FAMILY HISTORY:  No pertinent family history in first degree relatives      Allergies  penicillins (Unknown)      Antibiotics:    vancomycin  IVPB 1000 milliGRAM(s) IV Intermittent every 12 hours       REVIEW OF SYSTEMS:  CONSTITUTIONAL:  No Fever or chills  HEENT:  No diplopia or blurred vision.  No earache, sore throat or runny nose.  CARDIOVASCULAR:  No pressure, squeezing, strangling, tightness, heaviness or aching about the chest, neck, axilla or epigastrium.  RESPIRATORY:  No cough, shortness of breath  GASTROINTESTINAL:  No nausea, vomiting or diarrhea.  GENITOURINARY:  No dysuria, frequency or urgency. No Blood in urine  MUSCULOSKELETAL:  Rt Elbow pain  SKIN:  No change in skin, hair or nails.  NEUROLOGIC:  No paresthesias, fasciculations  PSYCHIATRIC:  No disorder of thought or mood.  ENDOCRINE:  No heat or cold intolerance  HEMATOLOGICAL:  No easy bruising or bleeding.       Physical Exam:  Vital Signs Last 24 Hrs  T(C): 37 (08 Aug 2018 08:10), Max: 37.2 (07 Aug 2018 19:50)  T(F): 98.6 (08 Aug 2018 08:10), Max: 99 (07 Aug 2018 19:50)  HR: 65 (08 Aug 2018 08:10) (64 - 69)  BP: 177/73 (08 Aug 2018 08:10) (106/70 - 177/73)  BP(mean): 82 (07 Aug 2018 20:39) (82 - 82)  RR: 18 (08 Aug 2018 08:10) (17 - 18)  SpO2: 98% (08 Aug 2018 08:10) (96% - 100%)      GEN: NAD, pleasant  HEENT: normocephalic and atraumatic. EOMI. PERRL.    NECK: Supple.   LUNGS: Clear to auscultation.  HEART: Regular rate and rhythm   ABDOMEN: Soft, nontender, and nondistended.  Positive bowel sounds.    : No CVA tenderness  EXTREMITIES: Without any edema.  MSK: Rt Elbow in dressing  NEUROLOGIC: No focal deficits  PSYCHIATRIC: Appropriate affect .  SKIN: No Rash      Labs:  08-08    136  |  100  |  18.0  ----------------------------<  191<H>  3.7   |  24.0  |  0.80    Ca    8.5<L>      08 Aug 2018 06:03    TPro  7.4  /  Alb  3.8  /  TBili  0.3<L>  /  DBili  x   /  AST  28  /  ALT  12  /  AlkPhos  70  08-07                          10.9   10.6  )-----------( 214      ( 07 Aug 2018 08:11 )             33.4       PT/INR - ( 07 Aug 2018 08:11 )   PT: 10.6 sec;   INR: 0.96 ratio         PTT - ( 07 Aug 2018 08:11 )  PTT:27.8 sec    LIVER FUNCTIONS - ( 07 Aug 2018 08:11 )  Alb: 3.8 g/dL / Pro: 7.4 g/dL / ALK PHOS: 70 U/L / ALT: 12 U/L / AST: 28 U/L / GGT: x           CARDIAC MARKERS ( 07 Aug 2018 08:11 )  x     / <0.01 ng/mL / x     / x     / x          Crystals, Synovial Fluid (08.07.18 @ 14:56)    Crystals, Synovial Fluid:   present      Cell Count, Body Fluid (08.07.18 @ 14:56)    Fluid Segmented Granulocytes: 89 %    Fluid Type: Synovial fluid    Body Fluid Appearance: Cloudy    BF Lymphocytes: 11 %    Tube Type: Lavender    Color - Body Fluid: Yellow    Total Nucleated Cell Count, Body Fluid: 54343 /uL    Total RBC Count: 2050 /uL      RECENT CULTURES:  08-07 @ 14:57 .Body Fluid Right Elbow, Synovial Fluid     Numerous White blood cells  No organisms seen

## 2018-08-08 NOTE — PROGRESS NOTE ADULT - SUBJECTIVE AND OBJECTIVE BOX
EMERITA CADE    [id number]    83y      Female    INTERVAL HPI/OVERNIGHT EVENTS:    patient being seen for elbow pain s/p aspiration, poorly controlled dm2 and med management. patient seen at bedside with daughter and continues to have     REVIEW OF SYSTEMS:    CONSTITUTIONAL: No fever, weight loss, or fatigue  RESPIRATORY: No cough, wheezing, hemoptysis; No shortness of breath  CARDIOVASCULAR: No chest pain, palpitations  GASTROINTESTINAL: No abdominal or epigastric pain. No nausea, vomiting  NEUROLOGICAL: No headaches, memory loss, loss of strength.  MISCELLANEOUS:      Vital Signs Last 24 Hrs  T(C): 36.8 (08 Aug 2018 11:20), Max: 37.2 (07 Aug 2018 19:50)  T(F): 98.3 (08 Aug 2018 11:20), Max: 99 (07 Aug 2018 19:50)  HR: 68 (08 Aug 2018 11:20) (64 - 69)  BP: 117/60 (08 Aug 2018 11:20) (106/70 - 177/73)  BP(mean): 82 (07 Aug 2018 20:39) (82 - 82)  RR: 18 (08 Aug 2018 11:20) (17 - 18)  SpO2: 97% (08 Aug 2018 11:20) (96% - 99%)    PHYSICAL EXAM:    GENERAL: NAD, well-groomed  HEENT: PERRL, +EOMI  NECK: soft, Supple, No JVD,   CHEST/LUNG: Clear to auscultation bilaterally; No wheezing  HEART: S1S2+, Regular rate and rhythm; No murmurs, rubs, or gallops  ABDOMEN: Soft, Nontender, Nondistended; Bowel sounds present  EXTREMITIES:  2+ Peripheral Pulses, No clubbing, cyanosis, or edema  SKIN: No rashes or lesions  NEURO: AAOX3, no focal deficits, no motor r sensory loss  PSYCH: normal mood      LABS:                        10.9   10.6  )-----------( 214      ( 07 Aug 2018 08:11 )             33.4     08-08    136  |  100  |  18.0  ----------------------------<  191<H>  3.7   |  24.0  |  0.80    Ca    8.5<L>      08 Aug 2018 06:03    TPro  7.4  /  Alb  3.8  /  TBili  0.3<L>  /  DBili  x   /  AST  28  /  ALT  12  /  AlkPhos  70  08-07    PT/INR - ( 07 Aug 2018 08:11 )   PT: 10.6 sec;   INR: 0.96 ratio         PTT - ( 07 Aug 2018 08:11 )  PTT:27.8 sec        MEDICATIONS  (STANDING):  aspirin  chewable 81 milliGRAM(s) Oral daily  cholecalciferol 1000 Unit(s) Oral daily  dextrose 5%. 1000 milliLiter(s) (50 mL/Hr) IV Continuous <Continuous>  dextrose 50% Injectable 12.5 Gram(s) IV Push once  dextrose 50% Injectable 25 Gram(s) IV Push once  dextrose 50% Injectable 25 Gram(s) IV Push once  enoxaparin Injectable 40 milliGRAM(s) SubCutaneous daily  gabapentin 300 milliGRAM(s) Oral at bedtime  insulin glargine Injectable (LANTUS) 10 Unit(s) SubCutaneous at bedtime  insulin lispro (HumaLOG) corrective regimen sliding scale   SubCutaneous three times a day before meals  losartan 100 milliGRAM(s) Oral daily  naproxen 500 milliGRAM(s) Oral daily  pantoprazole    Tablet 40 milliGRAM(s) Oral before breakfast  saccharomyces boulardii 250 milliGRAM(s) Oral two times a day  sodium chloride 0.9%. 1000 milliLiter(s) (100 mL/Hr) IV Continuous <Continuous>  vancomycin  IVPB      vancomycin  IVPB 1000 milliGRAM(s) IV Intermittent every 12 hours    MEDICATIONS  (PRN):  acetaminophen   Tablet. 650 milliGRAM(s) Oral every 6 hours PRN Mild Pain (1 - 3)  dextrose 40% Gel 15 Gram(s) Oral once PRN Blood Glucose LESS THAN 70 milliGRAM(s)/deciliter  glucagon  Injectable 1 milliGRAM(s) IntraMuscular once PRN Glucose LESS THAN 70 milligrams/deciliter  morphine  - Injectable 2 milliGRAM(s) IV Push every 6 hours PRN Severe Pain (7 - 10)  ondansetron Injectable 4 milliGRAM(s) IV Push once PRN Nausea and/or Vomiting  oxyCODONE    5 mG/acetaminophen 325 mG 1 Tablet(s) Oral every 6 hours PRN Moderate Pain (4 - 6)      RADIOLOGY & ADDITIONAL TESTS: EMERITA CADE    [id number]    83y      Female    INTERVAL HPI/OVERNIGHT EVENTS:    patient being seen for elbow pain s/p aspiration, poorly controlled dm2 and med management. patient seen at bedside with daughter and continues to have elbow pain.     REVIEW OF SYSTEMS:    CONSTITUTIONAL" pain   RESPIRATORY: No cough, wheezing, hemoptysis; No shortness of breath  CARDIOVASCULAR: No chest pain, palpitations  GASTROINTESTINAL: No abdominal or epigastric pain. No nausea, vomiting  NEUROLOGICAL: No headaches, memory loss, loss of strength.  MISCELLANEOUS:      Vital Signs Last 24 Hrs  T(C): 36.8 (08 Aug 2018 11:20), Max: 37.2 (07 Aug 2018 19:50)  T(F): 98.3 (08 Aug 2018 11:20), Max: 99 (07 Aug 2018 19:50)  HR: 68 (08 Aug 2018 11:20) (64 - 69)  BP: 117/60 (08 Aug 2018 11:20) (106/70 - 177/73)  BP(mean): 82 (07 Aug 2018 20:39) (82 - 82)  RR: 18 (08 Aug 2018 11:20) (17 - 18)  SpO2: 97% (08 Aug 2018 11:20) (96% - 99%)    PHYSICAL EXAM:    GENERAL: NAD,   HEENT: PERRL, +EOMI  CHEST/LUNG: Clear to auscultation bilaterally; No wheezing  HEART: S1S2+, Regular rate and rhythm; No murmurs,  ABDOMEN: Soft, Nontender,  EXTREMITIES:  right elbow tenderness   SKIN: No rashes or lesions  NEURO: AAOX3,   PSYCH: normal mood      LABS:                        10.9   10.6  )-----------( 214      ( 07 Aug 2018 08:11 )             33.4     08-08    136  |  100  |  18.0  ----------------------------<  191<H>  3.7   |  24.0  |  0.80    Ca    8.5<L>      08 Aug 2018 06:03    TPro  7.4  /  Alb  3.8  /  TBili  0.3<L>  /  DBili  x   /  AST  28  /  ALT  12  /  AlkPhos  70  08-07    PT/INR - ( 07 Aug 2018 08:11 )   PT: 10.6 sec;   INR: 0.96 ratio         PTT - ( 07 Aug 2018 08:11 )  PTT:27.8 sec        MEDICATIONS  (STANDING):  aspirin  chewable 81 milliGRAM(s) Oral daily  cholecalciferol 1000 Unit(s) Oral daily  dextrose 5%. 1000 milliLiter(s) (50 mL/Hr) IV Continuous <Continuous>  dextrose 50% Injectable 12.5 Gram(s) IV Push once  dextrose 50% Injectable 25 Gram(s) IV Push once  dextrose 50% Injectable 25 Gram(s) IV Push once  enoxaparin Injectable 40 milliGRAM(s) SubCutaneous daily  gabapentin 300 milliGRAM(s) Oral at bedtime  insulin glargine Injectable (LANTUS) 10 Unit(s) SubCutaneous at bedtime  insulin lispro (HumaLOG) corrective regimen sliding scale   SubCutaneous three times a day before meals  losartan 100 milliGRAM(s) Oral daily  naproxen 500 milliGRAM(s) Oral daily  pantoprazole    Tablet 40 milliGRAM(s) Oral before breakfast  saccharomyces boulardii 250 milliGRAM(s) Oral two times a day  sodium chloride 0.9%. 1000 milliLiter(s) (100 mL/Hr) IV Continuous <Continuous>  vancomycin  IVPB      vancomycin  IVPB 1000 milliGRAM(s) IV Intermittent every 12 hours    MEDICATIONS  (PRN):  acetaminophen   Tablet. 650 milliGRAM(s) Oral every 6 hours PRN Mild Pain (1 - 3)  dextrose 40% Gel 15 Gram(s) Oral once PRN Blood Glucose LESS THAN 70 milliGRAM(s)/deciliter  glucagon  Injectable 1 milliGRAM(s) IntraMuscular once PRN Glucose LESS THAN 70 milligrams/deciliter  morphine  - Injectable 2 milliGRAM(s) IV Push every 6 hours PRN Severe Pain (7 - 10)  ondansetron Injectable 4 milliGRAM(s) IV Push once PRN Nausea and/or Vomiting  oxyCODONE    5 mG/acetaminophen 325 mG 1 Tablet(s) Oral every 6 hours PRN Moderate Pain (4 - 6)      RADIOLOGY & ADDITIONAL TESTS:

## 2018-08-08 NOTE — PROGRESS NOTE ADULT - SUBJECTIVE AND OBJECTIVE BOX
Patient seen and examined at bedside, with . Reports improvement of pain and elbow ROM since aspiration performed yesterday. Aspiration + for crystals. No complaints.    Vital Signs Last 24 Hrs  T(C): 37 (08 Aug 2018 08:10), Max: 37.2 (07 Aug 2018 19:50)  T(F): 98.6 (08 Aug 2018 08:10), Max: 99 (07 Aug 2018 19:50)  HR: 65 (08 Aug 2018 08:10) (64 - 69)  BP: 177/73 (08 Aug 2018 08:10) (106/70 - 177/73)  BP(mean): 82 (07 Aug 2018 20:39) (82 - 82)  RR: 18 (08 Aug 2018 08:10) (17 - 18)  SpO2: 98% (08 Aug 2018 08:10) (96% - 100%)    RUE: Ace bandage dressing C/D/I. Removed. Patient able to AROM elbow,  with minimal pain. + WF/WE. + FROM phalanges. SILT. ext warm cap refill brisk    Crystals, Synovial Fluid (08.07.18 @ 14:56)    Crystals, Synovial Fluid:   present    Culture - Body Fluid with Gram Stain (08.07.18 @ 14:57)    Gram Stain:   Numerous White blood cells  No organisms seen    Specimen Source: .Body Fluid Right Elbow, Synovial Fluid    A/P: 83 y.o F with right elbow gouty arthritis  - recommend NSAIDs if patient can tolerate  - continue to follow cultures  - abx as per ID

## 2018-08-08 NOTE — CONSULT NOTE ADULT - PROBLEM SELECTOR RECOMMENDATION 9
Synovial fluid with elevated WBC and + Crystals   Likely gout vs septic arthritis   Will Continue IV Vancomycin pending culture results  If cultures negative can D/C antibiotics

## 2018-08-09 DIAGNOSIS — Z71.89 OTHER SPECIFIED COUNSELING: ICD-10-CM

## 2018-08-09 LAB
ANION GAP SERPL CALC-SCNC: 12 MMOL/L — SIGNIFICANT CHANGE UP (ref 5–17)
BUN SERPL-MCNC: 18 MG/DL — SIGNIFICANT CHANGE UP (ref 8–20)
CALCIUM SERPL-MCNC: 8.9 MG/DL — SIGNIFICANT CHANGE UP (ref 8.6–10.2)
CHLORIDE SERPL-SCNC: 101 MMOL/L — SIGNIFICANT CHANGE UP (ref 98–107)
CHOLEST SERPL-MCNC: 219 MG/DL — HIGH (ref 110–199)
CO2 SERPL-SCNC: 25 MMOL/L — SIGNIFICANT CHANGE UP (ref 22–29)
CREAT SERPL-MCNC: 0.84 MG/DL — SIGNIFICANT CHANGE UP (ref 0.5–1.3)
GLUCOSE BLDC GLUCOMTR-MCNC: 102 MG/DL — HIGH (ref 70–99)
GLUCOSE BLDC GLUCOMTR-MCNC: 200 MG/DL — HIGH (ref 70–99)
GLUCOSE BLDC GLUCOMTR-MCNC: 221 MG/DL — HIGH (ref 70–99)
GLUCOSE BLDC GLUCOMTR-MCNC: 248 MG/DL — HIGH (ref 70–99)
GLUCOSE SERPL-MCNC: 208 MG/DL — HIGH (ref 70–115)
HCT VFR BLD CALC: 31.9 % — LOW (ref 37–47)
HDLC SERPL-MCNC: 62 MG/DL — LOW
HGB BLD-MCNC: 10.6 G/DL — LOW (ref 12–16)
LIPID PNL WITH DIRECT LDL SERPL: 128 MG/DL — SIGNIFICANT CHANGE UP
MAGNESIUM SERPL-MCNC: 1.7 MG/DL — SIGNIFICANT CHANGE UP (ref 1.6–2.6)
MCHC RBC-ENTMCNC: 29 PG — SIGNIFICANT CHANGE UP (ref 27–31)
MCHC RBC-ENTMCNC: 33.2 G/DL — SIGNIFICANT CHANGE UP (ref 32–36)
MCV RBC AUTO: 87.2 FL — SIGNIFICANT CHANGE UP (ref 81–99)
PLATELET # BLD AUTO: 216 K/UL — SIGNIFICANT CHANGE UP (ref 150–400)
POTASSIUM SERPL-MCNC: 4.5 MMOL/L — SIGNIFICANT CHANGE UP (ref 3.5–5.3)
POTASSIUM SERPL-SCNC: 4.5 MMOL/L — SIGNIFICANT CHANGE UP (ref 3.5–5.3)
RBC # BLD: 3.66 M/UL — LOW (ref 4.4–5.2)
RBC # FLD: 13.1 % — SIGNIFICANT CHANGE UP (ref 11–15.6)
SODIUM SERPL-SCNC: 138 MMOL/L — SIGNIFICANT CHANGE UP (ref 135–145)
TOTAL CHOLESTEROL/HDL RATIO MEASUREMENT: 4 RATIO — SIGNIFICANT CHANGE UP (ref 3.3–7.1)
TRIGL SERPL-MCNC: 146 MG/DL — SIGNIFICANT CHANGE UP (ref 10–200)
TSH SERPL-MCNC: 2.17 UIU/ML — SIGNIFICANT CHANGE UP (ref 0.27–4.2)
WBC # BLD: 7 K/UL — SIGNIFICANT CHANGE UP (ref 4.8–10.8)
WBC # FLD AUTO: 7 K/UL — SIGNIFICANT CHANGE UP (ref 4.8–10.8)

## 2018-08-09 PROCEDURE — 99233 SBSQ HOSP IP/OBS HIGH 50: CPT

## 2018-08-09 RX ORDER — MAGNESIUM SULFATE 500 MG/ML
1 VIAL (ML) INJECTION ONCE
Qty: 0 | Refills: 0 | Status: COMPLETED | OUTPATIENT
Start: 2018-08-09 | End: 2018-08-09

## 2018-08-09 RX ORDER — INSULIN GLARGINE 100 [IU]/ML
20 INJECTION, SOLUTION SUBCUTANEOUS AT BEDTIME
Qty: 0 | Refills: 0 | Status: DISCONTINUED | OUTPATIENT
Start: 2018-08-09 | End: 2018-08-10

## 2018-08-09 RX ORDER — COLCHICINE 0.6 MG
0.6 TABLET ORAL DAILY
Qty: 0 | Refills: 0 | Status: DISCONTINUED | OUTPATIENT
Start: 2018-08-09 | End: 2018-08-10

## 2018-08-09 RX ORDER — AMLODIPINE BESYLATE 2.5 MG/1
2.5 TABLET ORAL DAILY
Qty: 0 | Refills: 0 | Status: DISCONTINUED | OUTPATIENT
Start: 2018-08-09 | End: 2018-08-10

## 2018-08-09 RX ADMIN — Medication 250 MILLIGRAM(S): at 21:08

## 2018-08-09 RX ADMIN — Medication 1000 UNIT(S): at 12:18

## 2018-08-09 RX ADMIN — Medication 500 MILLIGRAM(S): at 13:01

## 2018-08-09 RX ADMIN — Medication 0.6 MILLIGRAM(S): at 12:10

## 2018-08-09 RX ADMIN — Medication 2: at 12:09

## 2018-08-09 RX ADMIN — Medication 81 MILLIGRAM(S): at 17:15

## 2018-08-09 RX ADMIN — Medication 100 GRAM(S): at 11:49

## 2018-08-09 RX ADMIN — Medication 250 MILLIGRAM(S): at 17:15

## 2018-08-09 RX ADMIN — ENOXAPARIN SODIUM 40 MILLIGRAM(S): 100 INJECTION SUBCUTANEOUS at 12:10

## 2018-08-09 RX ADMIN — AMLODIPINE BESYLATE 2.5 MILLIGRAM(S): 2.5 TABLET ORAL at 12:19

## 2018-08-09 RX ADMIN — Medication 500 MILLIGRAM(S): at 12:18

## 2018-08-09 RX ADMIN — Medication 650 MILLIGRAM(S): at 20:27

## 2018-08-09 RX ADMIN — PANTOPRAZOLE SODIUM 40 MILLIGRAM(S): 20 TABLET, DELAYED RELEASE ORAL at 05:16

## 2018-08-09 RX ADMIN — Medication 250 MILLIGRAM(S): at 09:31

## 2018-08-09 RX ADMIN — Medication 1: at 08:16

## 2018-08-09 RX ADMIN — Medication 250 MILLIGRAM(S): at 05:16

## 2018-08-09 RX ADMIN — INSULIN GLARGINE 20 UNIT(S): 100 INJECTION, SOLUTION SUBCUTANEOUS at 21:08

## 2018-08-09 RX ADMIN — GABAPENTIN 300 MILLIGRAM(S): 400 CAPSULE ORAL at 21:08

## 2018-08-09 RX ADMIN — LOSARTAN POTASSIUM 100 MILLIGRAM(S): 100 TABLET, FILM COATED ORAL at 05:16

## 2018-08-09 NOTE — PHYSICAL THERAPY INITIAL EVALUATION ADULT - ADDITIONAL COMMENTS
Pt lives in a house with 2 steps to enter and a HR, no steps inside. Daughter and grandchildren will be home the first few days to help her re-adjust. She has a RW which she has been using.

## 2018-08-09 NOTE — PHYSICAL THERAPY INITIAL EVALUATION ADULT - ACTIVE RANGE OF MOTION EXAMINATION, REHAB EVAL
deficits as listed below/R shoulder flexion to 45deg/bilateral upper extremity Active ROM was WFL (within functional limits)/bilateral  lower extremity Active ROM was WFL (within functional limits)

## 2018-08-09 NOTE — PROGRESS NOTE ADULT - ASSESSMENT
Problem/Plan - 1:  ·  Problem: right elbow gouty arthritis. --> ortho and id following  s/p R elbow arthrocentesis with aspiration  --> start colchicine, c.w nsaids      Problem/Plan - 2:  ·  Problem: Chest pain.  Plan: resolved.  negative troponin, No EKG changes.     Problem/Plan - 3:  ·  Problem: Hyponatremia. resolved     Problem/Plan - 4:  ·  Problem: HTN (hypertension).  Plan: controlled.  --> home meds     Problem/Plan - 5:  ·  Problem: Diabetes.  poorly controlled  --> diabetic education  --> inc lantus to 20 units     dispo ---> hopeful dc tomorrow after diabetic teaching

## 2018-08-09 NOTE — PROGRESS NOTE ADULT - SUBJECTIVE AND OBJECTIVE BOX
Patient seen and eval at bedside. Patient states her right elbow feels 70% better. Patient states her shoulder feels bruised. Patient denies fever, chills, CP, SOB, dizziness. Denies trauma or fall recently.  Vital Signs Last 24 Hrs  T(C): 36.8 (09 Aug 2018 08:27), Max: 37.1 (08 Aug 2018 15:39)  T(F): 98.3 (09 Aug 2018 08:27), Max: 98.7 (08 Aug 2018 15:39)  HR: 65 (09 Aug 2018 08:27) (64 - 95)  BP: 141/55 (09 Aug 2018 08:27) (114/59 - 152/62)  BP(mean): --  RR: 18 (09 Aug 2018 08:27) (18 - 18)  SpO2: 97% (09 Aug 2018 08:27) (93% - 97%)    Culture - Body Fluid with Gram Stain (08.07.18 @ 14:57)    Gram Stain:   Numerous White blood cells  No organisms seen    Specimen Source: .Body Fluid Right Elbow, Synovial Fluid    Culture Results:   Culture in progress    PE: NAD, alert awake  Right elbow skin intact, no open wounds, erythema or ecchymosis  AROM  degrees, PROM 5-130 degrees  Gross SILT uln/med/rad distrib, rad pulse 2+  Right shoulder AROM 20 degrees Abduction, PROM 150 degrees Abduction/FF  Skin intact, no open wounds, no s/o infx, no deformity    A/P: s/p right elbow arthrocentesis revealing + crystals significant for gouty right elbow arthritis, likely right shoulder rotator cuff tear  ·	f/u cultures r/o infx  ·	sling for right shoulder for comfort as needed  ·	Cont gout medication  ·	Cont pain control  ·	DVT propx  ·	Cont care as per primary team Patient seen and eval at bedside. Patient states her right elbow feels 70% better. Patient states her shoulder feels bruised. Patient denies fever, chills, CP, SOB, dizziness. Denies trauma or fall recently.  Vital Signs Last 24 Hrs  T(C): 36.8 (09 Aug 2018 08:27), Max: 37.1 (08 Aug 2018 15:39)  T(F): 98.3 (09 Aug 2018 08:27), Max: 98.7 (08 Aug 2018 15:39)  HR: 65 (09 Aug 2018 08:27) (64 - 95)  BP: 141/55 (09 Aug 2018 08:27) (114/59 - 152/62)  BP(mean): --  RR: 18 (09 Aug 2018 08:27) (18 - 18)  SpO2: 97% (09 Aug 2018 08:27) (93% - 97%)    Culture - Body Fluid with Gram Stain (08.07.18 @ 14:57)    Gram Stain:   Numerous White blood cells  No organisms seen    Specimen Source: .Body Fluid Right Elbow, Synovial Fluid    Culture Results:   Culture in progress    PE: NAD, alert awake  Right elbow skin intact, no open wounds, erythema or ecchymosis  AROM  degrees, PROM 5-130 degrees  Gross SILT uln/med/rad distrib, rad pulse 2+  Right shoulder AROM 20 degrees Abduction, PROM 150 degrees Abduction/FF  Skin intact, no open wounds, no s/o infx, no deformity    A/P: s/p right elbow arthrocentesis revealing + crystals significant for gouty right elbow arthritis, likely right shoulder rotator cuff tear  ·	f/u cultures r/o infx  ·	sling for right shoulder for comfort as needed  ·	start colchicine - d/w primary team  ·	Cont pain control  ·	DVT propx  ·	Cont care as per primary team

## 2018-08-09 NOTE — PHYSICAL THERAPY INITIAL EVALUATION ADULT - PERTINENT HX OF CURRENT PROBLEM, REHAB EVAL
Pt is an 84 y/o female who presented from home with R elbow pain. (+) gout, per ortho PA note - likely R shoulder rotator cuff tear as well.

## 2018-08-09 NOTE — PROGRESS NOTE ADULT - SUBJECTIVE AND OBJECTIVE BOX
EMERITA CADE    [id number]    83y      Female    INTERVAL HPI/OVERNIGHT EVENTS:    patient being seen for gouty elbow arthritis, poorly controlled dm2 and med management. patient seen at bedside and states feeling much better      REVIEW OF SYSTEMS:    CONSTITUTIONAL: No fever, weight loss, or fatigue  RESPIRATORY: No cough, wheezing, hemoptysis; No shortness of breath  CARDIOVASCULAR: No chest pain, palpitations  GASTROINTESTINAL: No abdominal or epigastric pain. No nausea, vomiting  NEUROLOGICAL: No headaches, memory loss, loss of strength.  MISCELLANEOUS:      Vital Signs Last 24 Hrs  T(C): 36.8 (09 Aug 2018 08:27), Max: 37.1 (08 Aug 2018 15:39)  T(F): 98.3 (09 Aug 2018 08:27), Max: 98.7 (08 Aug 2018 15:39)  HR: 65 (09 Aug 2018 08:27) (64 - 95)  BP: 141/55 (09 Aug 2018 08:27) (114/59 - 152/62)  BP(mean): --  RR: 18 (09 Aug 2018 08:27) (18 - 18)  SpO2: 97% (09 Aug 2018 08:27) (93% - 97%)    PHYSICAL EXAM:    GENERAL: NAD,   HEENT: PERRL, +EOMI  CHEST/LUNG: Clear to auscultation bilaterally; No wheezing  HEART: S1S2+, Regular rate and rhythm; No murmurs,  ABDOMEN: Soft, Nontender,  EXTREMITIES: increased ROM of rue  SKIN: No rashes or lesions  NEURO: AAOX3,   PSYCH: normal mood      LABS:                        10.6   7.0   )-----------( 216      ( 09 Aug 2018 07:52 )             31.9     08-09    138  |  101  |  18.0  ----------------------------<  208<H>  4.5   |  25.0  |  0.84    Ca    8.9      09 Aug 2018 07:52  Mg     1.7     08-09        MEDICATIONS  (STANDING):  amLODIPine   Tablet 2.5 milliGRAM(s) Oral daily  aspirin  chewable 81 milliGRAM(s) Oral daily  cholecalciferol 1000 Unit(s) Oral daily  colchicine 0.6 milliGRAM(s) Oral daily  dextrose 5%. 1000 milliLiter(s) (50 mL/Hr) IV Continuous <Continuous>  dextrose 50% Injectable 12.5 Gram(s) IV Push once  dextrose 50% Injectable 25 Gram(s) IV Push once  dextrose 50% Injectable 25 Gram(s) IV Push once  enoxaparin Injectable 40 milliGRAM(s) SubCutaneous daily  gabapentin 300 milliGRAM(s) Oral at bedtime  insulin glargine Injectable (LANTUS) 20 Unit(s) SubCutaneous at bedtime  insulin lispro (HumaLOG) corrective regimen sliding scale   SubCutaneous three times a day before meals  losartan 100 milliGRAM(s) Oral daily  magnesium sulfate  IVPB 1 Gram(s) IV Intermittent once  naproxen 500 milliGRAM(s) Oral daily  pantoprazole    Tablet 40 milliGRAM(s) Oral before breakfast  saccharomyces boulardii 250 milliGRAM(s) Oral two times a day  sodium chloride 0.9%. 1000 milliLiter(s) (100 mL/Hr) IV Continuous <Continuous>  vancomycin  IVPB      vancomycin  IVPB 1000 milliGRAM(s) IV Intermittent every 12 hours    MEDICATIONS  (PRN):  acetaminophen   Tablet. 650 milliGRAM(s) Oral every 6 hours PRN Mild Pain (1 - 3)  dextrose 40% Gel 15 Gram(s) Oral once PRN Blood Glucose LESS THAN 70 milliGRAM(s)/deciliter  glucagon  Injectable 1 milliGRAM(s) IntraMuscular once PRN Glucose LESS THAN 70 milligrams/deciliter  morphine  - Injectable 2 milliGRAM(s) IV Push every 6 hours PRN Severe Pain (7 - 10)  ondansetron Injectable 4 milliGRAM(s) IV Push once PRN Nausea and/or Vomiting  oxyCODONE    5 mG/acetaminophen 325 mG 1 Tablet(s) Oral every 6 hours PRN Moderate Pain (4 - 6)      RADIOLOGY & ADDITIONAL TESTS:

## 2018-08-09 NOTE — OCCUPATIONAL THERAPY INITIAL EVALUATION ADULT - RANGE OF MOTION EXAMINATION
Left UE WFLs; Right UE 1/4-1/2 shoulder flexion, 1/2 elbow flexion/extension, full digit flexion/extension

## 2018-08-10 ENCOUNTER — TRANSCRIPTION ENCOUNTER (OUTPATIENT)
Age: 83
End: 2018-08-10

## 2018-08-10 VITALS
SYSTOLIC BLOOD PRESSURE: 136 MMHG | HEART RATE: 74 BPM | DIASTOLIC BLOOD PRESSURE: 62 MMHG | TEMPERATURE: 98 F | OXYGEN SATURATION: 97 % | RESPIRATION RATE: 18 BRPM

## 2018-08-10 DIAGNOSIS — Z71.89 OTHER SPECIFIED COUNSELING: ICD-10-CM

## 2018-08-10 LAB
GLUCOSE BLDC GLUCOMTR-MCNC: 156 MG/DL — HIGH (ref 70–99)
GLUCOSE BLDC GLUCOMTR-MCNC: 239 MG/DL — HIGH (ref 70–99)
VANCOMYCIN TROUGH SERPL-MCNC: 21.3 UG/ML — HIGH (ref 10–20)

## 2018-08-10 PROCEDURE — 87075 CULTR BACTERIA EXCEPT BLOOD: CPT

## 2018-08-10 PROCEDURE — 83735 ASSAY OF MAGNESIUM: CPT

## 2018-08-10 PROCEDURE — 36415 COLL VENOUS BLD VENIPUNCTURE: CPT

## 2018-08-10 PROCEDURE — 84484 ASSAY OF TROPONIN QUANT: CPT

## 2018-08-10 PROCEDURE — 99232 SBSQ HOSP IP/OBS MODERATE 35: CPT

## 2018-08-10 PROCEDURE — 89051 BODY FLUID CELL COUNT: CPT

## 2018-08-10 PROCEDURE — 73070 X-RAY EXAM OF ELBOW: CPT

## 2018-08-10 PROCEDURE — 73200 CT UPPER EXTREMITY W/O DYE: CPT

## 2018-08-10 PROCEDURE — 93005 ELECTROCARDIOGRAM TRACING: CPT

## 2018-08-10 PROCEDURE — 96374 THER/PROPH/DIAG INJ IV PUSH: CPT | Mod: XU

## 2018-08-10 PROCEDURE — 99284 EMERGENCY DEPT VISIT MOD MDM: CPT | Mod: 25

## 2018-08-10 PROCEDURE — 80053 COMPREHEN METABOLIC PANEL: CPT

## 2018-08-10 PROCEDURE — 82962 GLUCOSE BLOOD TEST: CPT

## 2018-08-10 PROCEDURE — 97116 GAIT TRAINING THERAPY: CPT

## 2018-08-10 PROCEDURE — 99239 HOSP IP/OBS DSCHRG MGMT >30: CPT

## 2018-08-10 PROCEDURE — 73030 X-RAY EXAM OF SHOULDER: CPT

## 2018-08-10 PROCEDURE — T1013: CPT

## 2018-08-10 PROCEDURE — 80202 ASSAY OF VANCOMYCIN: CPT

## 2018-08-10 PROCEDURE — 80061 LIPID PANEL: CPT

## 2018-08-10 PROCEDURE — 85610 PROTHROMBIN TIME: CPT

## 2018-08-10 PROCEDURE — 20605 DRAIN/INJ JOINT/BURSA W/O US: CPT | Mod: RT

## 2018-08-10 PROCEDURE — 89060 EXAM SYNOVIAL FLUID CRYSTALS: CPT

## 2018-08-10 PROCEDURE — 85027 COMPLETE CBC AUTOMATED: CPT

## 2018-08-10 PROCEDURE — 84443 ASSAY THYROID STIM HORMONE: CPT

## 2018-08-10 PROCEDURE — 71045 X-RAY EXAM CHEST 1 VIEW: CPT

## 2018-08-10 PROCEDURE — 97167 OT EVAL HIGH COMPLEX 60 MIN: CPT

## 2018-08-10 PROCEDURE — 87205 SMEAR GRAM STAIN: CPT

## 2018-08-10 PROCEDURE — 83036 HEMOGLOBIN GLYCOSYLATED A1C: CPT

## 2018-08-10 PROCEDURE — 85730 THROMBOPLASTIN TIME PARTIAL: CPT

## 2018-08-10 PROCEDURE — 97163 PT EVAL HIGH COMPLEX 45 MIN: CPT

## 2018-08-10 PROCEDURE — 87070 CULTURE OTHR SPECIMN AEROBIC: CPT

## 2018-08-10 PROCEDURE — 80048 BASIC METABOLIC PNL TOTAL CA: CPT

## 2018-08-10 RX ORDER — LOSARTAN/HYDROCHLOROTHIAZIDE 100MG-25MG
1 TABLET ORAL
Qty: 0 | Refills: 0 | COMMUNITY

## 2018-08-10 RX ORDER — METFORMIN HYDROCHLORIDE 850 MG/1
0 TABLET ORAL
Qty: 0 | Refills: 0 | COMMUNITY

## 2018-08-10 RX ORDER — LOSARTAN POTASSIUM 100 MG/1
1 TABLET, FILM COATED ORAL
Qty: 30 | Refills: 0 | OUTPATIENT
Start: 2018-08-10 | End: 2018-09-08

## 2018-08-10 RX ORDER — METFORMIN HYDROCHLORIDE 850 MG/1
1 TABLET ORAL
Qty: 60 | Refills: 0 | OUTPATIENT
Start: 2018-08-10 | End: 2018-09-08

## 2018-08-10 RX ORDER — INSULIN NPH HUM/REG INSULIN HM 70-30/ML
10 VIAL (ML) SUBCUTANEOUS
Qty: 10 | Refills: 0 | OUTPATIENT
Start: 2018-08-10 | End: 2018-09-08

## 2018-08-10 RX ORDER — AMLODIPINE BESYLATE 2.5 MG/1
10 TABLET ORAL DAILY
Qty: 0 | Refills: 0 | Status: DISCONTINUED | OUTPATIENT
Start: 2018-08-10 | End: 2018-08-10

## 2018-08-10 RX ORDER — COLCHICINE 0.6 MG
1 TABLET ORAL
Qty: 5 | Refills: 0 | OUTPATIENT
Start: 2018-08-10 | End: 2018-08-14

## 2018-08-10 RX ORDER — PANTOPRAZOLE SODIUM 20 MG/1
1 TABLET, DELAYED RELEASE ORAL
Qty: 30 | Refills: 0 | OUTPATIENT
Start: 2018-08-10 | End: 2018-09-08

## 2018-08-10 RX ORDER — AMLODIPINE BESYLATE 2.5 MG/1
1 TABLET ORAL
Qty: 30 | Refills: 0 | OUTPATIENT
Start: 2018-08-10 | End: 2018-09-08

## 2018-08-10 RX ADMIN — Medication 1: at 08:17

## 2018-08-10 RX ADMIN — AMLODIPINE BESYLATE 10 MILLIGRAM(S): 2.5 TABLET ORAL at 11:40

## 2018-08-10 RX ADMIN — Medication 0.6 MILLIGRAM(S): at 11:40

## 2018-08-10 RX ADMIN — PANTOPRAZOLE SODIUM 40 MILLIGRAM(S): 20 TABLET, DELAYED RELEASE ORAL at 06:17

## 2018-08-10 RX ADMIN — ENOXAPARIN SODIUM 40 MILLIGRAM(S): 100 INJECTION SUBCUTANEOUS at 11:40

## 2018-08-10 RX ADMIN — AMLODIPINE BESYLATE 2.5 MILLIGRAM(S): 2.5 TABLET ORAL at 06:17

## 2018-08-10 RX ADMIN — Medication 1000 UNIT(S): at 11:40

## 2018-08-10 RX ADMIN — LOSARTAN POTASSIUM 100 MILLIGRAM(S): 100 TABLET, FILM COATED ORAL at 06:17

## 2018-08-10 RX ADMIN — Medication 250 MILLIGRAM(S): at 06:17

## 2018-08-10 RX ADMIN — Medication 2: at 11:36

## 2018-08-10 NOTE — DISCHARGE NOTE ADULT - HOSPITAL COURSE
83 years old female with PMH of DM on oral meds and HTN presented to the ER with 2 days history of R elbow pain and swelling. Pain is constant, 8/10, radiating up and down the elbow. No h/o trauma, fever,  chills or similar pain in the past .    patient admitted to medicine and seen by ortho and id in consult. patient started on iv abx and patient had an arthrocentesis on 8/7. Patient diagnosed with gouty arthritis and torn rotator cuff and improved clinically wtih colchine and nsaids. Patient also found to have extremely elevated a1c and started on insulin.       time spent on dc 32 minutes

## 2018-08-10 NOTE — ADVANCED PRACTICE NURSE CONSULT - RECOMMEDATIONS
continue diabetes self management education w pt and family  insulin pen teaching  pt and family to return demonstration of insulin injection using prefilled insulin syringe and rn supervision  consider dc pt on 70/30 insulin pen 10 units w breakfast and dinner  jan needle  supply for her glucometer  cc- pls task pt and family to out pt diabetes wellness  metformin 500mg po 2xdaily

## 2018-08-10 NOTE — DISCHARGE NOTE ADULT - PLAN OF CARE
gouty arthritis of elbow follow up with ortho diabetic teaching, follow up with endocrine  insulin teaching STOP HCTZ added norvasc,

## 2018-08-10 NOTE — DISCHARGE NOTE ADULT - PATIENT PORTAL LINK FT
You can access the [facility], offered by [facility], by registering with the following website: [email or web address]

## 2018-08-10 NOTE — DISCHARGE NOTE ADULT - CARE PROVIDER_API CALL
Alex Davis (DO), Orthopaedic Surgery  [address] [place] [address]  Phone: [phone]  Fax: [phone]    Andres Espinoza (MD), EndocrinologyMetabDiabetes; Internal Medicine  [address] [place] [address]  Phone: [phone]  Fax: [phone]    Radha Auguste), Medicine  [address] [place] [address]  Phone: [phone]  Fax: [phone]

## 2018-08-10 NOTE — PROGRESS NOTE ADULT - SUBJECTIVE AND OBJECTIVE BOX
[facility]  INFECTIOUS DISEASES AND INTERNAL MEDICINE at [place]  =======================================================  Govind Coronado MD  Diplomates American Board of Internal Medicine and Infectious Diseases  =======================================================    EMERITA CADE [id number]    Follow up: right elbow pain gout    Allergies:  penicillins (Unknown)      Medications:  acetaminophen   Tablet. 650 milliGRAM(s) Oral every 6 hours PRN  amLODIPine   Tablet 10 milliGRAM(s) Oral daily  aspirin  chewable 81 milliGRAM(s) Oral daily  cholecalciferol 1000 Unit(s) Oral daily  colchicine 0.6 milliGRAM(s) Oral daily  dextrose 40% Gel 15 Gram(s) Oral once PRN  dextrose 5%. 1000 milliLiter(s) IV Continuous <Continuous>  dextrose 50% Injectable 12.5 Gram(s) IV Push once  dextrose 50% Injectable 25 Gram(s) IV Push once  dextrose 50% Injectable 25 Gram(s) IV Push once  enoxaparin Injectable 40 milliGRAM(s) SubCutaneous daily  gabapentin 300 milliGRAM(s) Oral at bedtime  glucagon  Injectable 1 milliGRAM(s) IntraMuscular once PRN  insulin glargine Injectable (LANTUS) 20 Unit(s) SubCutaneous at bedtime  insulin lispro (HumaLOG) corrective regimen sliding scale   SubCutaneous three times a day before meals  losartan 100 milliGRAM(s) Oral daily  morphine  - Injectable 2 milliGRAM(s) IV Push every 6 hours PRN  naproxen 500 milliGRAM(s) Oral every 12 hours  ondansetron Injectable 4 milliGRAM(s) IV Push once PRN  oxyCODONE    5 mG/acetaminophen 325 mG 1 Tablet(s) Oral every 6 hours PRN  pantoprazole    Tablet 40 milliGRAM(s) Oral before breakfast  sodium chloride 0.9%. 1000 milliLiter(s) IV Continuous <Continuous>    SOCIAL       FAMILY   FAMILY HISTORY:  No pertinent family history in first degree relatives    REVIEW OF SYSTEMS:  CONSTITUTIONAL:  No Fever or chills  HEENT:   No diplopia or blurred vision.  No earache, sore throat or runny nose.  CARDIOVASCULAR:  No pressure, squeezing, strangling, tightness, heaviness or aching about the chest, neck, axilla or epigastrium.  RESPIRATORY:  No cough, shortness of breath, PND or orthopnea.  GASTROINTESTINAL:  No nausea, vomiting or diarrhea.  GENITOURINARY:  No dysuria, frequency or urgency. No Blood in urine  MUSCULOSKELETAL:   AS PER HPI  SKIN:  No change in skin, hair or nails.  NEUROLOGIC:  No paresthesias, fasciculations, seizures or weakness.  PSYCHIATRIC:  No disorder of thought or mood.  ENDOCRINE:  No heat or cold intolerance, polyuria or polydipsia.  HEMATOLOGICAL:  No easy bruising or bleeding.            Physical Exam:  ICU Vital Signs Last 24 Hrs  T(C): 36.4 (10 Aug 2018 09:58), Max: 36.7 (09 Aug 2018 15:29)  T(F): 97.5 (10 Aug 2018 09:58), Max: 98.1 (09 Aug 2018 15:29)  HR: 66 (10 Aug 2018 09:58) (60 - 66)  BP: 140/58 (10 Aug 2018 09:58) (139/55 - 150/58)  BP(mean): --  ABP: --  ABP(mean): --  RR: 18 (10 Aug 2018 09:58) (18 - 18)  SpO2: 96% (10 Aug 2018 09:58) (95% - 98%)    GEN: NAD, pleasant  HEENT: normocephalic and atraumatic. EOMI. KENAN.    NECK: Supple. No carotid bruits.  No lymphadenopathy or thyromegaly.  LUNGS: Clear to auscultation.  HEART: Regular rate and rhythm without murmur.  ABDOMEN: Soft, nontender, and nondistended.  Positive bowel sounds.    : No CVA tenderness  EXTREMITIES: right elbow no ertythema mild edeam nl rom  MSK: no joint swelling  NEUROLOGIC: Cranial nerves II through XII are grossly intact.  PSYCHIATRIC: Appropriate affect .  SKIN: No ulceration or induration present.        Labs:  08-09    138  |  101  |  18.0  ----------------------------<  208<H>  4.5   |  25.0  |  0.84    Ca    8.9      09 Aug 2018 07:52  Mg     1.7     08-09                            10.6   7.0   )-----------( 216      ( 09 Aug 2018 07:52 )             31.9                 CAPILLARY BLOOD GLUCOSE      POCT Blood Glucose.: 239 mg/dL (10 Aug 2018 11:35)  POCT Blood Glucose.: 156 mg/dL (10 Aug 2018 07:55)  POCT Blood Glucose.: 221 mg/dL (09 Aug 2018 21:07)  POCT Blood Glucose.: 102 mg/dL (09 Aug 2018 16:58)        RECENT CULTURES:  08-07 @ 14:57 .Body Fluid Right Elbow, Synovial Fluid     No growth at 3 days.  Culture in progress    Numerous White blood cells  No organisms seen

## 2018-08-10 NOTE — DISCHARGE NOTE ADULT - MEDICATION SUMMARY - MEDICATIONS TO TAKE
I will START or STAY ON the medications listed below when I get home from the hospital:    glucometer  -- 1   -- Indication: For Dm2    bd needles  -- 1   -- Indication: For Dm    diabetic supplies  -- 1   -- Indication: For Diabetes    naproxen 500 mg oral tablet  -- 1 tab(s) by mouth every 12 hours, As Needed   -- Indication: For Elbow pain, right    losartan 100 mg oral tablet  -- 1 tab(s) by mouth once a day   -- Do not take this drug if you are pregnant.  It is very important that you take or use this exactly as directed.  Do not skip doses or discontinue unless directed by your doctor.  Some non-prescription drugs may aggravate your condition.  Read all labels carefully.  If a warning appears, check with your doctor before taking.    -- Indication: For HTN (hypertension)    gabapentin 300 mg oral capsule  -- orally once a day (at bedtime)  -- Indication: For Pain    NovoLIN 70/30 subcutaneous suspension  -- 10 unit(s) subcutaneous 2 times a day   -- Do not drink alcoholic beverages when taking this medication.  It is very important that you take or use this exactly as directed.  Do not skip doses or discontinue unless directed by your doctor.  Keep in refrigerator.  Do not freeze.    -- Indication: For Dm    colchicine 0.6 mg oral tablet  -- 1 tab(s) by mouth once a day  -- Indication: For gout    Norvasc 5 mg oral tablet  -- 1 tab(s) by mouth once a day   -- It is very important that you take or use this exactly as directed.  Do not skip doses or discontinue unless directed by your doctor.  Some non-prescription drugs may aggravate your condition.  Read all labels carefully.  If a warning appears, check with your doctor before taking.    -- Indication: For HTN (hypertension)    Protonix 40 mg oral delayed release tablet  -- 1 tab(s) by mouth once a day   -- It is very important that you take or use this exactly as directed.  Do not skip doses or discontinue unless directed by your doctor.  Obtain medical advice before taking any non-prescription drugs as some may affect the action of this medication.  Swallow whole.  Do not crush.    -- Indication: For gerd    Vitamin D3 5000 intl units oral capsule  -- 1 cap(s) by mouth once a day  -- Indication: For vitamins I will START or STAY ON the medications listed below when I get home from the hospital:    glucometer  -- 1   -- Indication: For Dm2    bd needles  -- 1   -- Indication: For Dm    diabetic supplies  -- 1   -- Indication: For Diabetes    naproxen 500 mg oral tablet  -- 1 tab(s) by mouth every 12 hours, As Needed   -- Indication: For Elbow pain, right    losartan 100 mg oral tablet  -- 1 tab(s) by mouth once a day   -- Do not take this drug if you are pregnant.  It is very important that you take or use this exactly as directed.  Do not skip doses or discontinue unless directed by your doctor.  Some non-prescription drugs may aggravate your condition.  Read all labels carefully.  If a warning appears, check with your doctor before taking.    -- Indication: For HTN (hypertension)    gabapentin 300 mg oral capsule  -- orally once a day (at bedtime)  -- Indication: For Pain    NovoLIN 70/30 subcutaneous suspension  -- 10 unit(s) subcutaneous 2 times a day   -- Do not drink alcoholic beverages when taking this medication.  It is very important that you take or use this exactly as directed.  Do not skip doses or discontinue unless directed by your doctor.  Keep in refrigerator.  Do not freeze.    -- Indication: For Dm    metFORMIN 500 mg oral tablet  -- 1 tab(s) by mouth 2 times a day   -- Check with your doctor before becoming pregnant.  Do not drink alcoholic beverages when taking this medication.  It is very important that you take or use this exactly as directed.  Do not skip doses or discontinue unless directed by your doctor.  Obtain medical advice before taking any non-prescription drugs as some may affect the action of this medication.  Take with food or milk.    -- Indication: For Dm2    colchicine 0.6 mg oral tablet  -- 1 tab(s) by mouth once a day  -- Indication: For gout    Norvasc 5 mg oral tablet  -- 1 tab(s) by mouth once a day   -- It is very important that you take or use this exactly as directed.  Do not skip doses or discontinue unless directed by your doctor.  Some non-prescription drugs may aggravate your condition.  Read all labels carefully.  If a warning appears, check with your doctor before taking.    -- Indication: For HTN (hypertension)    Protonix 40 mg oral delayed release tablet  -- 1 tab(s) by mouth once a day   -- It is very important that you take or use this exactly as directed.  Do not skip doses or discontinue unless directed by your doctor.  Obtain medical advice before taking any non-prescription drugs as some may affect the action of this medication.  Swallow whole.  Do not crush.    -- Indication: For gerd    Vitamin D3 5000 intl units oral capsule  -- 1 cap(s) by mouth once a day  -- Indication: For vitamins

## 2018-08-10 NOTE — PROGRESS NOTE ADULT - ASSESSMENT
84y/o  Female with h/o DM and HTN. Patient comes to the ER with 2-3 days of R elbow pain and swelling, associated with decreased ROM.  now improved fluid with crystals  no growth defer abx ok for d/c on on abx 82y/o  Female with h/o DM and HTN. Patient comes to the ER with 2-3 days of R elbow pain and swelling, associated with decreased ROM.  now improved fluid with crystals  no growth defer abx ok for d/c on  no antibiotics

## 2018-08-10 NOTE — DISCHARGE NOTE ADULT - MEDICATION SUMMARY - MEDICATIONS TO STOP TAKING
I will STOP taking the medications listed below when I get home from the hospital:    metFORMIN 850 mg oral tablet  -- orally 2 times a day I will STOP taking the medications listed below when I get home from the hospital:  None

## 2018-08-10 NOTE — PROGRESS NOTE ADULT - SUBJECTIVE AND OBJECTIVE BOX
Patient seen and eval at bedside. Patient states her right elbow feels 70% better. Patient states her shoulder feels bruised. Patient denies fever, chills, CP, SOB, dizziness. Denies trauma or fall recently.    Vital Signs Last 24 Hrs  T(C): 36.6 (10 Aug 2018 01:57), Max: 36.8 (09 Aug 2018 08:27)  T(F): 97.8 (10 Aug 2018 01:57), Max: 98.3 (09 Aug 2018 08:27)  HR: 64 (10 Aug 2018 01:57) (60 - 65)  BP: 150/58 (10 Aug 2018 01:57) (139/55 - 150/58)  RR: 18 (10 Aug 2018 01:57) (18 - 18)  SpO2: 95% (10 Aug 2018 01:57) (95% - 98%)    Culture - Body Fluid with Gram Stain (08.07.18 @ 14:57)    Gram Stain:   Numerous White blood cells  No organisms seen    Specimen Source: .Body Fluid Right Elbow, Synovial Fluid    Culture Results:   No growth at 2 days.  Culture in progress      PE: NAD, alert awake  Right elbow skin intact, no open wounds, erythema or ecchymosis  AROM  degrees, PROM 5-150 degrees  Gross SILT uln/med/rad distrib, rad pulse 2+      A/P: s/p right elbow arthrocentesis revealing + crystals significant for gouty right elbow arthritis, likely right shoulder rotator cuff tear  ·	f/u cultures r/o infx - negative x 2 days  ·	sling for right shoulder for comfort as needed  ·	Cont gout meds  ·	Cont pain control  ·	DVT propx  ·	Cont care as per primary team  ·	Keep patient in hospital until cultures final Patient seen and eval at bedside. Patient states her right elbow feels better than yesterday. Patient has no other complaints and is very thankful. Patient denies fever, chills, CP, SOB, dizziness.     Culture - Body Fluid with Gram Stain (08.07.18 @ 14:57)    Gram Stain:   Numerous White blood cells  No organisms seen    Specimen Source: .Body Fluid Right Elbow, Synovial Fluid    Culture Results:   No growth at 2 days.  Culture in progress      PE: NAD, alert awake  Right elbow skin intact, no open wounds, erythema or ecchymosis  AROM  degrees, PROM 5-150 degrees  Gross SILT uln/med/rad distrib, rad pulse 2+      A/P: s/p right elbow arthrocentesis revealing + crystals significant for gouty right elbow arthritis, likely right shoulder rotator cuff tear  ·	f/u cultures r/o infx - negative x 2 days  ·	sling for right shoulder for comfort as needed  ·	Cont gout meds  ·	Cont pain control  ·	DVT propx  ·	Cont care as per primary team  ·	Keep patient in hospital until cultures final

## 2018-08-10 NOTE — DISCHARGE NOTE ADULT - MEDICATION SUMMARY - MEDICATIONS TO CHANGE
I will SWITCH the dose or number of times a day I take the medications listed below when I get home from the hospital:    losartan-hydrochlorothiazide 100mg-12.5mg oral tablet  -- 1 tab(s) by mouth once a day I will SWITCH the dose or number of times a day I take the medications listed below when I get home from the hospital:    metFORMIN 850 mg oral tablet  -- orally 2 times a day    losartan-hydrochlorothiazide 100mg-12.5mg oral tablet  -- 1 tab(s) by mouth once a day

## 2018-08-10 NOTE — DISCHARGE NOTE ADULT - CARE PLAN
Principal Discharge DX:	Arthritis of elbow  Goal:	gouty arthritis of elbow  Assessment and plan of treatment:	follow up with ortho  Secondary Diagnosis:	Diabetes  Goal:	diabetic teaching,  Assessment and plan of treatment:	follow up with endocrine  insulin teaching  Secondary Diagnosis:	High blood pressure  Goal:	STOP HCTZ  Assessment and plan of treatment:	added norvasc,  Secondary Diagnosis:	Rotator cuff injury  Goal:	follow up with ortho

## 2018-08-12 LAB
CULTURE RESULTS: SIGNIFICANT CHANGE UP
SPECIMEN SOURCE: SIGNIFICANT CHANGE UP

## 2018-08-15 PROBLEM — E11.9 TYPE 2 DIABETES MELLITUS WITHOUT COMPLICATIONS: Chronic | Status: ACTIVE | Noted: 2018-08-07

## 2018-08-15 PROBLEM — I10 ESSENTIAL (PRIMARY) HYPERTENSION: Chronic | Status: ACTIVE | Noted: 2018-08-07

## 2018-08-27 ENCOUNTER — APPOINTMENT (OUTPATIENT)
Dept: VASCULAR SURGERY | Facility: CLINIC | Age: 83
End: 2018-08-27
Payer: MEDICAID

## 2018-08-27 VITALS
HEIGHT: 58.5 IN | WEIGHT: 150 LBS | OXYGEN SATURATION: 97 % | BODY MASS INDEX: 30.65 KG/M2 | TEMPERATURE: 98.1 F | HEART RATE: 69 BPM | DIASTOLIC BLOOD PRESSURE: 68 MMHG | SYSTOLIC BLOOD PRESSURE: 146 MMHG

## 2018-08-27 DIAGNOSIS — E11.42 TYPE 2 DIABETES MELLITUS WITH DIABETIC POLYNEUROPATHY: ICD-10-CM

## 2018-08-27 DIAGNOSIS — I73.9 PERIPHERAL VASCULAR DISEASE, UNSPECIFIED: ICD-10-CM

## 2018-08-27 DIAGNOSIS — M79.604 PAIN IN RIGHT LEG: ICD-10-CM

## 2018-08-27 DIAGNOSIS — M79.605 PAIN IN RIGHT LEG: ICD-10-CM

## 2018-08-27 PROCEDURE — 99214 OFFICE O/P EST MOD 30 MIN: CPT

## 2018-08-27 RX ORDER — PREGABALIN 50 MG/1
50 CAPSULE ORAL 3 TIMES DAILY
Qty: 180 | Refills: 1 | Status: ACTIVE | OUTPATIENT
Start: 2018-08-27

## 2018-08-31 PROBLEM — M79.604 PAIN IN BOTH LOWER EXTREMITIES: Status: ACTIVE | Noted: 2017-04-06

## 2018-08-31 PROBLEM — I73.9 PAD (PERIPHERAL ARTERY DISEASE): Status: ACTIVE | Noted: 2017-04-06

## 2018-10-29 ENCOUNTER — APPOINTMENT (OUTPATIENT)
Dept: VASCULAR SURGERY | Facility: CLINIC | Age: 83
End: 2018-10-29

## 2023-02-10 NOTE — PATIENT PROFILE ADULT. - CAREGIVER
Protocol For Photochemotherapy For Severe Photoresponsive Dermatoses: Puva: The patient received Photochemotherapy for severe photoresponsive dermatoses: PUVA requiring at least 4 to 8 hours of care under direct physician supervision. No

## 2023-05-03 NOTE — OCCUPATIONAL THERAPY INITIAL EVALUATION ADULT - HOME MANAGEMENT SKILLS, PREVIOUS LEVEL OF FUNCTION, OT EVAL
-- DO NOT REPLY / DO NOT REPLY ALL --  -- Message is from [facility] ([facility]) --    General Patient Message: Care Rep requesting a list of all active precriptions to be sent to them.      Fax- [phone]    Alternative phone number:     Can a detailed message be left? No    Message Turnaround: WINORTH:    Refer to site's KB page for routing instructions    Please give this turnaround time to the caller:   \"You can expect to receive a response 2-3 business days after your provider's clinical team reviews the message\"               needed assist

## 2024-06-20 NOTE — ED ADULT NURSE REASSESSMENT NOTE - NSFALLRSKINDICATORS_ED_ALL_ED
I am Taye Sotelo. I'm a patient there. My birth date is [birth year]. I need a prescription refill for Anoro. My phone number is [phone]. Call me back. Thank you. OK.    
no
no
Fall